# Patient Record
Sex: FEMALE | Employment: FULL TIME | URBAN - METROPOLITAN AREA
[De-identification: names, ages, dates, MRNs, and addresses within clinical notes are randomized per-mention and may not be internally consistent; named-entity substitution may affect disease eponyms.]

---

## 2019-05-02 ENCOUNTER — HOSPITAL ENCOUNTER (OUTPATIENT)
Dept: PHYSICAL THERAPY | Age: 44
Discharge: HOME | End: 2019-05-02
Payer: COMMERCIAL

## 2019-05-02 PROCEDURE — 97110 THERAPEUTIC EXERCISES: CPT | Performed by: PHYSICAL THERAPIST

## 2019-05-02 PROCEDURE — 97161 PT EVAL LOW COMPLEX 20 MIN: CPT | Performed by: PHYSICAL THERAPIST

## 2019-05-02 NOTE — PROGRESS NOTES
[facility], [address] [place] [address] Phone: [phone]  Fax: [phone] Plan of Care/Statement of Necessity for Physical Therapy Services  2-15 Patient name: Angelo Pastrana  : 1975  Provider#: [id number] Referral source: Referred, Self, MD     
Medical/Treatment Diagnosis: Pain in right shoulder [M25.511] Prior Hospitalization: see medical history Comorbidities: see evaluation. Prior Level of Function: see evaluation. Medications: Verified on Patient Summary List 
 
Start of Care: see evaluation. Onset Date: See evaluation The Plan of Care and following information is based on the information from the initial evaluation. Assessment/ key information: Pt is a 42 yo female with increased right shoulder pain since 2018. Pt notes past PT was somewhat helpful but feels she hit a plateau. Pt presents with weakness in RC and signs of decreased periscapular strength, painful ROM (PROM, AROM) and decreased ability to reach overhead or across her body. Treatment Plan may include any combination of the following: Therapeutic exercise, Therapeutic activities, Neuromuscular re-education, Physical agent/modality, Manual therapy, Patient education and Self Care training Patient / Family readiness to learn indicated by: asking questions, trying to perform skills and interest 
Persons(s) to be included in education: patient (P) Barriers to Learning/Limitations: None Patient Goal (s): see eval Patient Self Reported Health Status: good Rehabilitation Potential: good Short Term Goals: To be accomplished in 2-3 weeks: 
 1) Pt independent in HEP from day 1 
 2) Pt will be able to reach overhead to 140 deg or more of AROM without increased pain Long Term Goals: To be accomplished in 4-6 weeks: 
 1) Pt independent in final HEP.  2) Pt will report she is able perform ADL's such as reaching to opposite underarm to apply deodorant, etc.  
 3) Pt will be able to return to UE strengthening at the gym with modifications as needed from PT without increased pain. 4) Pt will be able to reach behind her back to T10 or higher for less difficulty with tucking in her shirt or bathing. Frequency / Duration: Patient to be seen 2 times per week for 4-6 weeks. Patient/ Caregiver education and instruction: self care and exercises 
 
[x]  Plan of care has been reviewed with LEWIS Portillo, PT DPT, [facility] 5/2/2019 3:55 PM 
 
________________________________________________________________________ I certify that the above Therapy Services are being furnished while the patient is under my care. I agree with the treatment plan and certify that this therapy is necessary. [de-identified] Signature:____________________  Date:____________Time: _________

## 2019-05-02 NOTE — PROGRESS NOTES
[employer] [facility] [address], [place], [address] Phone: [phone]  Fax: [phone] PT INITIAL EVALUATION NOTE - [facility] 2-15 Patient Name: Eric Pritchard Date:2019 : 1975 [x]  Patient  Verified Payor: BLUE CROSS / Plan: [employer] / Product Type: HMO /   
In time:4:05  Out time:5:05 Total Treatment Time (min): 60 Total Timed Codes (min): 25 
1:1 Treatment Time ([facility] only): n/a Visit #: 1 Treatment Area: Pain in right shoulder [M25.511] SUBJECTIVE Any medication changes, allergies to medications, adverse drug reactions, diagnosis change, or new procedure performed?: [] No    [x] Yes (see summary sheet for update) Current symptoms/chief complaint:  Right shoulder pain. Date of onset/injury: 2018. Went to Ortho in 2018 and was told she had bursitis. She had x-rays and these were negative. He gave her meds but she prefers not to take medication. She didn't take it. She went to acupuncture and went to a PT from December through 2019. She reports things are getting better but she felt like there was no communication so she wanted to change. The only home exercises she had were standing shoulder ER and diagonals and reaching behind her back with the towel. She reports they did some cradling of her arm, she was doing standing tricep extensions. She went to [facility]. Aggravated by:  Lift something heavy makes it worse. Reaching into abduction, adduction for ADL's. Reaching overhead or overextending forward. She can't put a bar or a broomstick across her shoulders to perform squats. Reaching behind her to get her pocket book from behind her. Reaching over to turn her alarm off in the morning, yawning and stretching in the morning. Reaching behind her back is hard too. She is nervous of making it worse and avoids trying to throw a ball with her son. Eased by: rest.  
 
Pt has tried icing on and off. Pain Level (0-10 scale): Current: 0/10. Worst: 6/10. Location of symptoms: right shoulder, lateral deltoid. She used to hear clicks but she doesn't anymore. Pain will be sharp and then it goes away. PMH: Significant for unremarkable but is right handed. 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 She used to work to go to [employer] and did upper body, now she is focused on lower body due to shoulder pain. She has 2 boys 15 yo 6 yo. Prior level of function: Was able to exercise upper body at the gym, reach and perform ADL's without pain prior to September 2018. Patient goal(s): \"have my right shoulder feel back normal so I can do the things I used to\" Objective:   
 
Observation/posture: slight medial border winging on the RIGHT. 
 
ROM:   
                                           AROM                                                                PROM Right Left  Right Left Flexion 135 p! Lateral shoulder 150 Flexion 146 p! nt  
 p! Lateral shoulder 170  p! nt  
   ER @ 90 Degrees 67 p! (most painful motion) 105 IR @ 90 Degrees 40  64 Functional ER nt nt Functional IR L4 painful T6 Scapulohumoral Control / Rhythm: right scapula increased UR with flexion and abduction as compared to the left. Accessory Motions: NT today. Strength:                                                                          
 R (1-5) L (1-5) Shoulder Scaption / supraspinatus 4 5 Shoulder Abduction nt 5 Shoulder ER 4 5 Shoulder IR 4 5 Elbow Flexion NT NT Elbow Extension NT NT  
Supination NT NT  
Pronation NT NT  
 
Palpation:  No TTP supraspinatus, infraspinatus or biceps (proximal portion) today. Increased tissue turgor and TTP R UT as compared to L UT muscles. Special Tests: 
Painful arc  [] Pos   [x] Neg Neer's Test  [x] Pos   [] Neg  
 Hawkin's Test  [x] Pos   [] Neg  
Empty Can  [x] Pos   [] Neg 
Full Can   [x] Pos   [] Neg  
 
Speed's Test  [] Pos   [x] Neg for pain, + for weakness on the RIGHT Scapular Assist Test  [] Pos   [x] Neg 
Scapular Retraction Test [x] Pos   [] Neg Outcome Measure: Patient presents with an initial FOTO score of next visit. Today's Treatment: 
 
25 min Therapeutic Exercise:  [x] See flow sheet : Pt performed all HEP per HEP sheet. See in paper chart. Rationale: increase strength, improve coordination and increase proprioception to improve the patients ability to reach [facility]. With 
 [] TE 
 [] TA 
 [] neuro 
 [] other: Patient Education: [x] Review HEP [] Progressed/Changed HEP based on:  
[] positioning   [] body mechanics   [] transfers   [] heat/ice application   
[] other:   
 
Pain Level (0-10 scale) post treatment: 0 Assessment:  
[x] See POC Plan:   
[x] See POC Remi Dupree PT, DPT, O[facility]  
5/2/2019 3:51 PM

## 2019-05-07 ENCOUNTER — HOSPITAL ENCOUNTER (OUTPATIENT)
Dept: PHYSICAL THERAPY | Age: 44
Discharge: HOME | End: 2019-05-07
Payer: COMMERCIAL

## 2019-05-07 PROCEDURE — 97110 THERAPEUTIC EXERCISES: CPT | Performed by: PHYSICAL THERAPIST

## 2019-05-07 NOTE — PROGRESS NOTES
PT DAILY TREATMENT NOTE 2-15 Patient Name: Eric Organ Date:2019 : 1975 [x]  Patient  Verified Payor: BLUE CROSS / Plan: [employer] / Product Type: HMO /   
In time:4:05  Out time:4:45 Total Treatment Time (min): 40 Total Timed Codes (min): 40 
1:1 Treatment Time ( only): 40 Visit #: 2 Treatment Area: Pain in right shoulder [M25.511] SUBJECTIVE Pain Level (0-10 scale), subjective functional status/changes: Pt reports 0/10 pain at rest but still having pain when trying to reach up overhead. Pt reports no pain with her HEP from day 1. Any medication changes, allergies to medications, adverse drug reactions, diagnosis change, or new procedure performed?: [x] No    [] Yes (see summary sheet for update) SEE ABOVE. OBJECTIVE Pt with increased pain with shoulder flexion and shows shoulder shrug.   
 
- min Modality:   
  []  Ice     []  Heat Position/location:   -  
Rationale: decrease pain to improve the patients ability to do functional activities [x] Skin assessment post-treatment:  [x]intact []redness- no adverse reaction 
  []redness  adverse reaction:  
  
40 min Therapeutic Exercise:  [x] See flow sheet : added supine shoulder IR and ER, prone horizonal ABD, prone shoulder extension. Brief manual therapy : rhythmic stab. In varying degrees of scaption for IR/ER up to 90 deg. Rationale: increase strength, improve coordination and increase proprioception to improve the patients ability to reach [facility] 
 
- min Manual Therapy:  -  
Rationale: decrease pain, increase tissue extensibility and decrease trigger points  to improve the patients ability to - With 
 [] TE 
 [] TA 
 [] neuro 
 [] other: Patient Education: [x] Review HEP [] Progressed/Changed HEP based on:  
[] positioning   [] body mechanics   [] transfers   [] heat/ice application   
[] other:   
 
 
Pain Level (0-10 scale) post treatment: 0, pt notes decreased pain with right shoulder flexion end of session, noted decreased shoulder shrug. ASSESSMENT/Changes in Function: Pt with decreased strength/stability with increased scaption with rhythmic stabilization. Patient will continue to benefit from skilled PT services to modify and progress therapeutic interventions, address ROM deficits, address strength deficits, analyze and cue movement patterns, assess and modify postural abnormalities and instruct in home and community integration to attain remaining goals. Progress towards goals / Updated goals: 
Goals were not re-assessed today. PLAN    [x]  Continue plan of care 
 
[]  Other:_   
 
Srinivas Nicholson, PT DPT, O[facility] 5/7/2019  1:72 PM       PT License #[id number]

## 2019-05-09 ENCOUNTER — HOSPITAL ENCOUNTER (OUTPATIENT)
Dept: PHYSICAL THERAPY | Age: 44
Discharge: HOME | End: 2019-05-09
Payer: COMMERCIAL

## 2019-05-09 PROCEDURE — 97110 THERAPEUTIC EXERCISES: CPT | Performed by: PHYSICAL THERAPIST

## 2019-05-09 PROCEDURE — 97140 MANUAL THERAPY 1/> REGIONS: CPT | Performed by: PHYSICAL THERAPIST

## 2019-05-09 NOTE — PROGRESS NOTES
PT DAILY TREATMENT NOTE 2-15 Patient Name: Jaz Duff Date:2019 : 1975 [x]  Patient  Verified Payor: BLUE CROSS / Plan: Tami Gay / Product Type: HMO /    
In time:4:00  Out time:4:55 Total Treatment Time (min):55 Total Timed Codes (min): 45 
1:1 Treatment Time [facility] only):45 Visit #: 3 Treatment Area: Pain in right shoulder [M25.511] SUBJECTIVE Pain Level (0-10 scale), subjective functional status/changes: Pt reports 0/10 pain at rest.  Pt reports she does still have pain with donning/doffing clothes but less painful that it has been in the past. 
Any medication changes, allergies to medications, adverse drug reactions, diagnosis change, or new procedure performed?: [x] No    [] Yes (see summary sheet for update) SEE ABOVE. OBJECTIVE 10 min Modality:   
  [x]  Ice     []  Heat Position/location:    R shoulder end of session Rationale: decrease pain to improve the patients ability to do functional activities [x] Skin assessment post-treatment:  [x]intact []redness- no adverse reaction 
  []redness  adverse reaction:  
  
35 min Therapeutic Exercise:  [x] See flow sheet: supine ABC, supine SA punch and progressed to stand ER and IR with t-band. Rationale: increase strength, improve coordination and increase proprioception to improve the patients ability to reach [place] 
 
10 min Manual Therapy: rhythmic stab. In varying degrees of scaption for IR/ER up to 90 deg. Rhythmic stab. In varying degrees of flexion. Resistance more proximal.  
Rationale: decrease pain, increase tissue extensibility and decrease trigger points  to improve the patients ability to - With 
 [] TE 
 [] TA 
 [] neuro 
 [] other: Patient Education: [x] Review HEP [] Progressed/Changed HEP based on:  
[] positioning   [] body mechanics   [] transfers   [] heat/ice application   
[] other: Pain Level (0-10 scale) post treatment: 0, pt notes decreased pain with right shoulder flexion end of session. ASSESSMENT/Changes in Function: Pt without increased pain today and able to progress with ther. Ex. To focus on RC strength and scap. Stability. Patient will continue to benefit from skilled PT services to modify and progress therapeutic interventions, address ROM deficits, address strength deficits, analyze and cue movement patterns, assess and modify postural abnormalities and instruct in home and community integration to attain remaining goals. Progress towards goals / Updated goals: 
Goals were not re-assessed today. PLAN    [x]  Continue plan of care 
 
[]  Other:_   
 
Srinivas Nicholson, PT DPT, O[facility] 5/9/2019  8:01 PM       PT License #[id number]

## 2019-05-14 ENCOUNTER — APPOINTMENT (OUTPATIENT)
Dept: PHYSICAL THERAPY | Age: 44
End: 2019-05-14
Payer: COMMERCIAL

## 2019-05-16 ENCOUNTER — HOSPITAL ENCOUNTER (OUTPATIENT)
Dept: PHYSICAL THERAPY | Age: 44
Discharge: HOME | End: 2019-05-16
Payer: COMMERCIAL

## 2019-05-16 PROCEDURE — 97140 MANUAL THERAPY 1/> REGIONS: CPT | Performed by: PHYSICAL THERAPIST

## 2019-05-16 PROCEDURE — 97110 THERAPEUTIC EXERCISES: CPT | Performed by: PHYSICAL THERAPIST

## 2019-05-16 NOTE — PROGRESS NOTES
PT DAILY TREATMENT NOTE 2-15 Patient Name: Chidi Swain Date:2019 : 1975 [x]  Patient  Verified Payor: BLUE CROSS / Plan: Ama Perez / Product Type: HMO /    
In time:4:00  Out time:500 Total Treatment Time (min):60 Total Timed Codes (min): 50 
1:1 Treatment Time [facility] only):35 Visit #: 4 Treatment Area: Pain in right shoulder [M25.511] SUBJECTIVE Pain Level (0-10 scale), subjective functional status/changes: Pt reports 0/10 pain at rest.  Pt reports since using the pillow she is sleeping better and overall she has less pain with movement of her shoulder. Any medication changes, allergies to medications, adverse drug reactions, diagnosis change, or new procedure performed?: [x] No    [] Yes (see summary sheet for update) SEE ABOVE. OBJECTIVE  
R shoulder flexion 135 deg,  deg (was 129 deg), Functional IR to T10 vertebral level (all AROM) 10 min Modality:   
  [x]  Ice     []  Heat Position/location:    R shoulder end of session Rationale: decrease pain to improve the patients ability to do functional activities [x] Skin assessment post-treatment:  [x]intact []redness- no adverse reaction 
  []redness  adverse reaction:  
  
40 min Therapeutic Exercise:  [x] See flow sheet: Added t-band rows, sh. Ext. Rationale: increase strength, improve coordination and increase proprioception to improve the patients ability to reach [place] 
 
10 min Manual Therapy: rhythmic stab. In varying degrees of scaption for IR/ER up to 90 deg without resistance then with 2 lb weight. Rhythmic stab. In varying degrees of flexion without resistance then with 2 lb weight. Resistance more proximal.  
Rationale: decrease pain, increase tissue extensibility and decrease trigger points  to improve the patients ability to - With 
 [] TE 
 [] TA 
 [] neuro 
 [] other: Patient Education: [x] Review HEP [] Progressed/Changed HEP based on: [] positioning   [] body mechanics   [] transfers   [] heat/ice application   
[] other:   
 
 Pain Level (0-10 scale) post treatment: 0 
 
ASSESSMENT/Changes in Function: Pt with improved right shoulder abduction and decreased pain overall with shoulder AROM since first visit. Patient will continue to benefit from skilled PT services to modify and progress therapeutic interventions, address ROM deficits, address strength deficits, analyze and cue movement patterns, assess and modify postural abnormalities and instruct in home and community integration to attain remaining goals. Progress towards goals / Updated goals: 
Goals were not re-assessed today. PLAN    [x]  Continue plan of care 
 
[]  Other:_   
 
Karen Oppenheim, PT DPT, OCS 5/16/2019  5:07 PM       PT License #[id number]

## 2019-05-23 ENCOUNTER — HOSPITAL ENCOUNTER (OUTPATIENT)
Dept: PHYSICAL THERAPY | Age: 44
Discharge: HOME | End: 2019-05-23
Payer: COMMERCIAL

## 2019-05-23 PROCEDURE — 97110 THERAPEUTIC EXERCISES: CPT | Performed by: PHYSICAL THERAPIST

## 2019-05-23 PROCEDURE — 97140 MANUAL THERAPY 1/> REGIONS: CPT | Performed by: PHYSICAL THERAPIST

## 2019-05-23 NOTE — PROGRESS NOTES
PT DAILY TREATMENT NOTE 2-15    Patient Name: Jackelyn Rick  Date:2019  : 1975  [x]  Patient  Verified  Payor: Jigna Acevedo / Plan: Justine Griffith / Product Type: HMO /     In time:4:00  Out time: 505   Total Treatment Time (min):65   Total Timed Codes (min): 55  1:1 Treatment Time [facility] only):55    Visit #: 5    Treatment Area: Pain in right shoulder [M25.511]    SUBJECTIVE  Pain Level (0-10 scale), subjective functional status/changes: Pt reports 0/10 pain at rest.  Pt reports with some movements she is getting some lateral proxial shoulder pain. Any medication changes, allergies to medications, adverse drug reactions, diagnosis change, or new procedure performed?: [x] No    [] Yes (see summary sheet for update) SEE ABOVE. OBJECTIVE         10 min Modality:      [x]  Ice     []  Heat       Position/location:    R shoulder end of session   Rationale: decrease pain to improve the patients ability to do functional activities   [x] Skin assessment post-treatment:  [x]intact []redness- no adverse reaction    []redness  adverse reaction:      45 min Therapeutic Exercise:  [x] See flow sheet: Added ball on wall and scapular pro/retraction on rockerboard in quadruped. Rationale: increase strength, improve coordination and increase proprioception to improve the patients ability to reach [place]    10 min Manual Therapy: rhythmic stab. In varying degrees of scaption for IR/ER up to 90 deg without resistance then with 2 lb weight. Rhythmic stab. In varying degrees of flexion without resistance then with 2 lb weight.    Resistance more proximal.   Rationale: decrease pain, increase tissue extensibility and decrease trigger points  to improve the patients ability to -          With   [] TE   [] TA   [] neuro   [] other: Patient Education: [x] Review HEP    [] Progressed/Changed HEP based on:   [] positioning   [] body mechanics   [] transfers   [] heat/ice application    [] other:       Pain Level (0-10 scale) post treatment: 0    ASSESSMENT/Changes in Function:   Pt did well with progression in ther. Ex. Today. She had some pain with standing shoulder IR so we held that today. Patient will continue to benefit from skilled PT services to modify and progress therapeutic interventions, address ROM deficits, address strength deficits, analyze and cue movement patterns, assess and modify postural abnormalities and instruct in home and community integration to attain remaining goals. Progress towards goals / Updated goals:  Goals were not re-assessed today.      PLAN      [x]  Continue plan of care    []  Other:_      Cindy Villa, PT DPT, OCS 2019  6:91 PM       PT License #[id number]

## 2019-05-28 ENCOUNTER — HOSPITAL ENCOUNTER (OUTPATIENT)
Dept: PHYSICAL THERAPY | Age: 44
Discharge: HOME | End: 2019-05-28
Payer: COMMERCIAL

## 2019-05-28 PROCEDURE — 97140 MANUAL THERAPY 1/> REGIONS: CPT | Performed by: PHYSICAL THERAPIST

## 2019-05-28 PROCEDURE — 97110 THERAPEUTIC EXERCISES: CPT | Performed by: PHYSICAL THERAPIST

## 2019-05-28 NOTE — PROGRESS NOTES
PT DAILY TREATMENT NOTE 2-15    Patient Name: Neri Luna  Date:2019  : 1975  [x]  Patient  Verified  Payor: Primo Cedeño / Plan: Mauro Aadme / Product Type: HMO /     In time:4:00  Out time: 5:00   Total Treatment Time (min): 60   Total Timed Codes (min): 50  1:1 Treatment Time [facility] only):40    Visit #: 6    Treatment Area: Pain in right shoulder [M25.511]    SUBJECTIVE  Pain Level (0-10 scale), subjective functional status/changes: Pt reports 0/10 pain at rest.  She did have some shoulder pain earlier today while making a movement to keep the strap on her dress on. Any medication changes, allergies to medications, adverse drug reactions, diagnosis change, or new procedure performed?: [x] No    [] Yes (see summary sheet for update) SEE ABOVE. OBJECTIVE         10 min Modality:      [x]  Ice     []  Heat       Position/location:    R shoulder end of session   Rationale: decrease pain to improve the patients ability to do functional activities   [x] Skin assessment post-treatment:  [x]intact []redness- no adverse reaction    []redness  adverse reaction:      40 min Therapeutic Exercise:  [x] See flow sheet: Progressed prone T and I to over swiss ball       Rationale: increase strength, improve coordination and increase proprioception to improve the patients ability to reach [place]    10 min Manual Therapy: rhythmic stab. In varying degrees of scaption for IR/ER up to 90 deg without resistance then with 2 lb weight (scaption only)    Attempted light resistance with D1 but inc. Pain, light resistance with PNF D2 pattern in pain free range approx 10-20 reps.     Rationale: decrease pain, increase tissue extensibility and decrease trigger points  to improve the patients ability to -          With   [] TE   [] TA   [] neuro   [] other: Patient Education: [x] Review HEP    [] Progressed/Changed HEP based on:   [] positioning   [] body mechanics   [] transfers   [] heat/ice application    [] other:       Pain Level (0-10 scale) post treatment: 0    ASSESSMENT/Changes in Function:   Pt did well with progression to prone over swiss ball. She seems to have the least stability and control with shoulder in closer to end range flexion/scaption. Patient will continue to benefit from skilled PT services to modify and progress therapeutic interventions, address ROM deficits, address strength deficits, analyze and cue movement patterns, assess and modify postural abnormalities and instruct in home and community integration to attain remaining goals. Progress towards goals / Updated goals:  Goals were not re-assessed today. PLAN      [x]  Continue plan of care    [x]  Other:_ add quadruped self ball throw?       Danisha Miller, PT DPT, OCS 5/28/2019  8:97 PM       PT License #[id number]

## 2019-05-30 ENCOUNTER — HOSPITAL ENCOUNTER (OUTPATIENT)
Dept: PHYSICAL THERAPY | Age: 44
Discharge: HOME | End: 2019-05-30
Payer: COMMERCIAL

## 2019-05-30 PROCEDURE — 97110 THERAPEUTIC EXERCISES: CPT | Performed by: PHYSICAL THERAPIST

## 2019-05-30 PROCEDURE — 97140 MANUAL THERAPY 1/> REGIONS: CPT | Performed by: PHYSICAL THERAPIST

## 2019-05-30 NOTE — PROGRESS NOTES
PT Re-assessment DAILY TREATMENT NOTE 2-15    Patient Name: Wesley Haider  Date:2019  : 1975  [x]  Patient  Verified  Payor: Avni Burnett / Plan: Sudeep Wayne / Product Type: HMO /     In time:3:30  Out time: 435    Total Treatment Time (min):65    Total Timed Codes (min): 65  1:1 Treatment Time [facility] only):40    Visit #: 7    Treatment Area: Pain in right shoulder [M25.511]    SUBJECTIVE  Pain Level (0-10 scale), subjective functional status/changes: Pt reports pain is 0/10 at rest.  She still has pain when applying deodorant, taking a shirt on and off but it is less severe as compared to the first day. Any medication changes, allergies to medications, adverse drug reactions, diagnosis change, or new procedure performed?: [x] No    [] Yes (see summary sheet for update) SEE ABOVE. OBJECTIVE     * LEFT was not measured again today, LEFT measurements are from evaluation. Observation/posture: slight medial border winging on the RIGHT.     ROM:                                               AROM                                                                PROM    Right Left   Right Left   Flexion  137 with a \"pull\" and \"uncomfortable\"  150   nt    deg with pain. 170   nt         ER @ 90 Degrees 70 p!  (most painful motion) 105          IR @ 90 Degrees 40  64   Functional ER nt nt         Functional IR L2 painful T6                               Accessory Motions: some hypomobility with inferior and posterior gh mobs.      Strength:                                                                             R (1-5) L (1-5)   Shoulder Scaption / supraspinatus 4 p! 5   Shoulder Abduction nt 5   Shoulder ER 5 5   Shoulder IR 5 5   Elbow Flexion NT NT   Elbow Extension NT NT   Supination NT NT   Pronation NT NT     Special Tests:    Neer's Test                  [x] Pos   [] Neg        Hawkin's Test              [x] Pos   [] Neg          Full Can                       [x] Pos   [] Neg Scapular Retraction Test        [x] Pos   [] Neg             To go min Modality:      [x]  Ice     []  Heat       Position/location:    R shoulder end of session   Rationale: decrease pain to improve the patients ability to do functional activities   [x] Skin assessment post-treatment:  [x]intact []redness- no adverse reaction    []redness  adverse reaction:      55 min Therapeutic Exercise:  [x] See flow sheet:   Re-assessment. Progressed per flow sheet. Rationale: increase strength, improve coordination and increase proprioception to improve the patients ability to reach [place]    10 min Manual Therapy: rhythmic stab. In varying degrees of scaption for IR/ER up to 90 deg without resistance then with 2 lb weight (scaption only)    Inferior and posterior GH mobs. Rationale: decrease pain, increase tissue extensibility and decrease trigger points  to improve the patients ability to -          With   [] TE   [] TA   [] neuro   [] other: Patient Education: [x] Review HEP    [] Progressed/Changed HEP based on:   [] positioning   [] body mechanics   [] transfers   [] heat/ice application    [] other:       Pain Level (0-10 scale) post treatment: 0    ASSESSMENT/Changes in Function:   Pt with 7 skilled PT session from 05/02 through 05/30. Pt shows improved ROM (ABD) with less pain with motions. She is still restricted on the right as compared to the left and cont. To have pain with don/doff a shirt and with reaching up behind her back. Also with applying deodorant. She notes pain is less severe. We are recommending more PT but that she should also f/u with Dr. Chavo Henson. Patient will continue to benefit from skilled PT services to modify and progress therapeutic interventions, address ROM deficits, address strength deficits, analyze and cue movement patterns, assess and modify postural abnormalities and instruct in home and community integration to attain remaining goals. Short Term Goals:  To be accomplished in 2-3 weeks:              1) Pt independent in HEP from day 1 MET               2) Pt will be able to reach overhead to 140 deg or more of AROM without increased pain PROGRESSING     Long Term Goals: To be accomplished in 4-6 weeks:              1) Pt independent in final HEP. progressing              2) Pt will report she is able perform ADL's such as reaching to opposite underarm to apply deodorant, etc. progressing               3) Pt will be able to return to UE strengthening at the gym with modifications as needed from PT without increased pain. progressing              4) Pt will be able to reach behind her back to T10 or higher for less difficulty with tucking in her shirt or bathing. progressing.        PLAN      [x]  Continue plan of care    [x]  Other:_ cont 1-2x/week for 4 weeks from 05/30     Joaquina Quiroz PT DPT, [facility] 5/30/2019  0:05 PM       PT License #[id number]

## 2019-06-04 ENCOUNTER — HOSPITAL ENCOUNTER (OUTPATIENT)
Dept: PHYSICAL THERAPY | Age: 44
Discharge: HOME | End: 2019-06-04
Payer: COMMERCIAL

## 2019-06-04 PROCEDURE — 97110 THERAPEUTIC EXERCISES: CPT | Performed by: PHYSICAL THERAPIST

## 2019-06-04 PROCEDURE — 97140 MANUAL THERAPY 1/> REGIONS: CPT | Performed by: PHYSICAL THERAPIST

## 2019-06-04 NOTE — PROGRESS NOTES
PT  DAILY TREATMENT NOTE 2-15    Patient Name: Krystina Gordon  Date:2019  : 1975  [x]  Patient  Verified  Payor: Merritt Kincaid / Plan: Fabricio Nab / Product Type: HMO /     In time:4:00  Out time: 510   Total Treatment Time (min):70    Total Timed Codes (min): 60  1:1 Treatment Time ( [address] only): 50   Visit #: 8    Treatment Area: Pain in right shoulder [M25.511]    SUBJECTIVE  Pain Level (0-10 scale), subjective functional status/changes: Pt reports pain is 0/10 at rest.  Pt reports she was able to get in with Dr. Cira Rodriguez for . She is planning to ask him to have a MRI because she just really wants to see what is going on for piece of mind. Any medication changes, allergies to medications, adverse drug reactions, diagnosis change, or new procedure performed?: [x] No    [] Yes (see summary sheet for update) SEE ABOVE. OBJECTIVE     10' min Modality:      [x]  Ice     []  Heat       Position/location:    R shoulder end of session   Rationale: decrease pain to improve the patients ability to do functional activities   [x] Skin assessment post-treatment:  [x]intact []redness- no adverse reaction    []redness  adverse reaction:      50 min Therapeutic Exercise:  [x] See flow sheet:     Added thoracic extension over [employer] 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 Rationale: increase strength, improve coordination and increase proprioception to improve the patients ability to reach [place]    10 min Manual Therapy: thoracic PA mobs T3 through T7. Central - grade III   Inferior and posterior GH mobs.     Rationale: decrease pain, increase tissue extensibility and decrease trigger points  to improve the patients ability to -          With   [] TE   [] TA   [] neuro   [] other: Patient Education: [x] Review HEP    [] Progressed/Changed HEP based on:   [] positioning   [] body mechanics   [] transfers   [] heat/ice application    [] other: Pain Level (0-10 scale) post treatment: 0    ASSESSMENT/Changes in Function:   Pt did well with progress in ther ex. She does continue with most pain with shoulder abduction. Patient will continue to benefit from skilled PT services to modify and progress therapeutic interventions, address ROM deficits, address strength deficits, analyze and cue movement patterns, assess and modify postural abnormalities and instruct in home and community integration to attain remaining goals. Short Term Goals: To be accomplished in 2-3 weeks:              1) Pt independent in HEP from day 1 MET               2) Pt will be able to reach overhead to 140 deg or more of AROM without increased pain PROGRESSING     Long Term Goals: To be accomplished in 4-6 weeks:              1) Pt independent in final HEP. progressing              2) Pt will report she is able perform ADL's such as reaching to opposite underarm to apply deodorant, etc. progressing               3) Pt will be able to return to UE strengthening at the gym with modifications as needed from PT without increased pain. progressing              4) Pt will be able to reach behind her back to T10 or higher for less difficulty with tucking in her shirt or bathing. progressing.        PLAN      [x]  Continue plan of care    [x]  Other:_ cont 1-2x/week for 4 weeks from 05/30     Aime Lozano, PT DPT, [facility] 6/4/2019  9:50 PM       PT License #[id number]

## 2019-06-06 ENCOUNTER — HOSPITAL ENCOUNTER (OUTPATIENT)
Dept: PHYSICAL THERAPY | Age: 44
Discharge: HOME | End: 2019-06-06
Payer: COMMERCIAL

## 2019-06-06 PROCEDURE — 97140 MANUAL THERAPY 1/> REGIONS: CPT | Performed by: PHYSICAL THERAPIST

## 2019-06-06 PROCEDURE — 97110 THERAPEUTIC EXERCISES: CPT | Performed by: PHYSICAL THERAPIST

## 2019-06-10 ENCOUNTER — APPOINTMENT (OUTPATIENT)
Dept: PHYSICAL THERAPY | Age: 44
End: 2019-06-10
Payer: COMMERCIAL

## 2019-06-13 ENCOUNTER — APPOINTMENT (OUTPATIENT)
Dept: PHYSICAL THERAPY | Age: 44
End: 2019-06-13
Payer: COMMERCIAL

## 2019-06-17 ENCOUNTER — HOSPITAL ENCOUNTER (OUTPATIENT)
Dept: MAMMOGRAPHY | Age: 44
Discharge: HOME | End: 2019-06-17
Attending: SPECIALIST
Payer: COMMERCIAL

## 2019-06-17 DIAGNOSIS — Z12.39 BREAST CANCER SCREENING: ICD-10-CM

## 2019-06-17 PROCEDURE — 77063 BREAST TOMOSYNTHESIS BI: CPT

## 2019-06-20 ENCOUNTER — HOSPITAL ENCOUNTER (OUTPATIENT)
Dept: PHYSICAL THERAPY | Age: 44
Discharge: HOME | End: 2019-06-20
Payer: COMMERCIAL

## 2019-06-20 PROCEDURE — 97110 THERAPEUTIC EXERCISES: CPT | Performed by: PHYSICAL THERAPIST

## 2019-06-20 PROCEDURE — 97140 MANUAL THERAPY 1/> REGIONS: CPT | Performed by: PHYSICAL THERAPIST

## 2019-06-20 NOTE — PROGRESS NOTES
PT  Re-eval / DAILY TREATMENT NOTE 2-15    Patient Name: Wesley Haider  Date:2019  : 1975  [x]  Patient  Verified  Payor: Avni Burnett / Plan: Sudeep Wayne / Product Type: HMO /     In time: 330  Out time:   435  Total Treatment Time (min):65    Total Timed Codes (min): 55  1:1 Treatment Time [facility] only):55    Visit #: 10    Treatment Area: Pain in right shoulder [M25.511]    SUBJECTIVE  Pain Level (0-10 scale), subjective functional status/changes: Pt reports pain is 0/10 at rest.  Pt reports she is not having as much severe pain / can move it better since cortisone injection. Dr. Brielle Zamora thinks she has frozen shoulder and advised her to take a break from PT last week. Any medication changes, allergies to medications, adverse drug reactions, diagnosis change, or new procedure performed?: [x] No    [] Yes (see summary sheet for update) SEE ABOVE. OBJECTIVE     R shoulder flexion 127 deg,  deg, scaption 135 deg, Functional IR to T12      10' min Modality:      [x]  Ice     []  Heat       Position/location:    R shoulder end of session   Rationale: decrease pain to improve the patients ability to do functional activities   [x] Skin assessment post-treatment:  [x]intact []redness- no adverse reaction    []redness  adverse reaction:      30 min Therapeutic Exercise:  [x] See flow sheet:     Focused on AAROM home program per flow sheet and updated HEP. Rationale: increase strength, improve coordination and increase proprioception to improve the patients ability to reach [place]    25 min Manual Therapy: thoracic PA mobs T3 through T7. Central - grade III   Inferior and posterior GH mobs.     Rationale: decrease pain, increase tissue extensibility and decrease trigger points  to improve the patients ability to -          With   [] TE   [] TA   [] neuro   [] other: Patient Education: [x] Review HEP    [] Progressed/Changed HEP based on:   [] positioning   [] body mechanics   [] transfers [] heat/ice application    [] other:       Pain Level (0-10 scale) post treatment: 0      ASSESSMENT/Changes in Function:   Pt with recent dx of adhesive capsulitis with cortisone injection for right shoulder. We will focus on improving joint mobility and ROM. Patient will continue to benefit from skilled PT services to modify and progress therapeutic interventions, address ROM deficits, address strength deficits, analyze and cue movement patterns, assess and modify postural abnormalities and instruct in home and community integration to attain remaining goals. Short Term Goals: To be accomplished in 2-3 weeks:              1) Pt independent in HEP from day 1 MET               2) Pt will be able to reach overhead to 140 deg or more of AROM without increased pain PROGRESSING     Long Term Goals: To be accomplished in 4-6 weeks:              1) Pt independent in final HEP. progressing              2) Pt will report she is able perform ADL's such as reaching to opposite underarm to apply deodorant, etc. progressing               3) Pt will be able to return to UE strengthening at the gym with modifications as needed from PT without increased pain. progressing              4) Pt will be able to reach behind her back to T10 or higher for less difficulty with tucking in her shirt or bathing. progressing.        PLAN      [x]  Continue plan of care    [x]  Other:_ cont 1-2x/week for 4 weeks from 06/20     Vijay Serrano, PT DPT, [facility] 6/20/2019  8:46 PM       PT License #[id number]

## 2019-06-25 ENCOUNTER — HOSPITAL ENCOUNTER (OUTPATIENT)
Dept: PHYSICAL THERAPY | Age: 44
Discharge: HOME | End: 2019-06-25
Payer: COMMERCIAL

## 2019-06-25 PROCEDURE — 97110 THERAPEUTIC EXERCISES: CPT | Performed by: PHYSICAL THERAPIST

## 2019-06-25 PROCEDURE — 97140 MANUAL THERAPY 1/> REGIONS: CPT | Performed by: PHYSICAL THERAPIST

## 2019-06-25 NOTE — PROGRESS NOTES
PT DAILY TREATMENT NOTE 2-15    Patient Name: Chidi Swain  Date:2019  : 1975  [x]  Patient  Verified  Payor: Shannan Martin / Plan: Ama Perez / Product Type: HMO /     In time: 4:00  Out time:   455   Total Treatment Time (min):55    Total Timed Codes (min): 55  1:1 Treatment Time [facility] only):55    Visit #: 11    Treatment Area: Pain in right shoulder [M25.511]    SUBJECTIVE  Pain Level (0-10 scale), subjective functional status/changes: Pt reports pain is 0/10 at rest.  Pt reports she did some bicep curls and tricep ext. At the gym - had a little more arm soreness after this. Any medication changes, allergies to medications, adverse drug reactions, diagnosis change, or new procedure performed?: [x] No    [] Yes (see summary sheet for update) SEE ABOVE. OBJECTIVE     10' min Modality:      [x]  Ice     []  Heat       Position/location:    R shoulder end of session   Rationale: decrease pain to improve the patients ability to do functional activities   [x] Skin assessment post-treatment:  [x]intact []redness- no adverse reaction    []redness  adverse reaction:      30 min Therapeutic Exercise:  [x] See flow sheet:   Progressed per flow sheet. Rationale: increase strength, improve coordination and increase proprioception to improve the patients ability to reach [facility]    25 min Manual Therapy: inferior, posterior and anterior GH mobs. Passive ROM with stretch at end range flexion, abduction, IR and ER.      Rationale: decrease pain, increase tissue extensibility and decrease trigger points  to improve the patients ability to -          With   [] TE   [] TA   [] neuro   [] other: Patient Education: [x] Review HEP    [] Progressed/Changed HEP based on:   [] positioning   [] body mechanics   [] transfers   [] heat/ice application    [] other:       Pain Level (0-10 scale) post treatment: 0     ASSESSMENT/Changes in Function:   Pt notes decreased pain with her AAROM exercises with manual therapy performed beforehand. Patient will continue to benefit from skilled PT services to modify and progress therapeutic interventions, address ROM deficits, address strength deficits, analyze and cue movement patterns, assess and modify postural abnormalities and instruct in home and community integration to attain remaining goals. Short Term Goals: To be accomplished in 2-3 weeks:              1) Pt independent in HEP from day 1 MET               2) Pt will be able to reach overhead to 140 deg or more of AROM without increased pain PROGRESSING     Long Term Goals: To be accomplished in 4-6 weeks:              1) Pt independent in final HEP. progressing              2) Pt will report she is able perform ADL's such as reaching to opposite underarm to apply deodorant, etc. progressing               3) Pt will be able to return to UE strengthening at the gym with modifications as needed from PT without increased pain. progressing              4) Pt will be able to reach behind her back to T10 or higher for less difficulty with tucking in her shirt or bathing. progressing.        PLAN      [x]  Continue plan of care    [x]  Other:_ cont 1-2x/week for 4 weeks from 06/20     Jose De Jesus Castro, PT DPT, [facility] 6/25/2019  2:36 PM       PT License #[id number]

## 2019-06-27 ENCOUNTER — HOSPITAL ENCOUNTER (OUTPATIENT)
Dept: PHYSICAL THERAPY | Age: 44
Discharge: HOME | End: 2019-06-27
Payer: COMMERCIAL

## 2019-06-27 PROCEDURE — 97140 MANUAL THERAPY 1/> REGIONS: CPT | Performed by: PHYSICAL THERAPIST

## 2019-06-27 PROCEDURE — 97110 THERAPEUTIC EXERCISES: CPT | Performed by: PHYSICAL THERAPIST

## 2019-06-27 NOTE — PROGRESS NOTES
PT DAILY TREATMENT NOTE 2-15    Patient Name: Tabatha Chapman  Date:2019  : 1975  [x]  Patient  Verified  Payor: Abigail Holguin / Plan: Lexie Lawrence / Product Type: HMO /     In time: 4:00  Out time:   455  Total Treatment Time (min):55    Total Timed Codes (min): 55  1:1 Treatment Time ([address] only)55    Visit #: 12    Treatment Area: Pain in right shoulder [M25.511]    SUBJECTIVE  Pain Level (0-10 scale), subjective functional status/changes: Pt reports pain is 0/10 at rest.  Pt reports she felt a lot better after last visit, shoulder is feeling much better. Any medication changes, allergies to medications, adverse drug reactions, diagnosis change, or new procedure performed?: [x] No    [] Yes (see summary sheet for update) SEE ABOVE. OBJECTIVE     10' min Modality:      [x]  Ice     []  Heat       Position/location:    R shoulder end of session   Rationale: decrease pain to improve the patients ability to do functional activities   [x] Skin assessment post-treatment:  [x]intact []redness- no adverse reaction    []redness  adverse reaction:      30 min Therapeutic Exercise:  [x] See flow sheet:   Progressed per flow sheet. Rationale: increase strength, improve coordination and increase proprioception to improve the patients ability to reach [facility]    25 min Manual Therapy: inferior, posterior and anterior GH mobs. Passive ROM with stretch at end range flexion, abduction, IR and ER. Rationale: decrease pain, increase tissue extensibility and decrease trigger points  to improve the patients ability to -          With   [] TE   [] TA   [] neuro   [] other: Patient Education: [x] Review HEP    [] Progressed/Changed HEP based on:   [] positioning   [] body mechanics   [] transfers   [] heat/ice application    [] other:       Pain Level (0-10 scale) post treatment: 0     ASSESSMENT/Changes in Function:   Pt notes decreased pain/discomfort overall with certain movements and ther.  Ex. Patient will continue to benefit from skilled PT services to modify and progress therapeutic interventions, address ROM deficits, address strength deficits, analyze and cue movement patterns, assess and modify postural abnormalities and instruct in home and community integration to attain remaining goals. Short Term Goals: To be accomplished in 2-3 weeks:              1) Pt independent in HEP from day 1 MET               2) Pt will be able to reach overhead to 140 deg or more of AROM without increased pain PROGRESSING     Long Term Goals: To be accomplished in 4-6 weeks:              1) Pt independent in final HEP. progressing              2) Pt will report she is able perform ADL's such as reaching to opposite underarm to apply deodorant, etc. progressing               3) Pt will be able to return to UE strengthening at the gym with modifications as needed from PT without increased pain. progressing              4) Pt will be able to reach behind her back to T10 or higher for less difficulty with tucking in her shirt or bathing. progressing.        PLAN      [x]  Continue plan of care    [x]  Other:_ cont 1-2x/week for 4 weeks from 06/20     Tom Oilver, PT DPT, hospitals 6/27/2019  2:66 PM       PT License #[id number]

## 2019-07-01 ENCOUNTER — HOSPITAL ENCOUNTER (OUTPATIENT)
Dept: PHYSICAL THERAPY | Age: 44
Discharge: HOME | End: 2019-07-01
Payer: COMMERCIAL

## 2019-07-01 PROCEDURE — 97110 THERAPEUTIC EXERCISES: CPT | Performed by: PHYSICAL THERAPIST

## 2019-07-01 PROCEDURE — 97140 MANUAL THERAPY 1/> REGIONS: CPT | Performed by: PHYSICAL THERAPIST

## 2019-07-01 NOTE — PROGRESS NOTES
PT DAILY TREATMENT NOTE 2-15    Patient Name: Laly Welsh  Date:2019  : 1975  [x]  Patient  Verified  Payor: John Points / Plan: Dawson Pepe / Product Type: HMO /     In time: 300  Out time: 410    Total Treatment Time (min)70   Total Timed Codes (min): 60   1:1 Treatment Time ([address] only) 60    Visit #: 13    Treatment Area: Pain in right shoulder [M25.511]    SUBJECTIVE  Pain Level (0-10 scale), subjective functional status/changes: Pt reports pain is 0/10 at rest.  Pt reports she thinks her ROM has improved and she has less pain with donning and doffing clothing. She still is having difficulty reaching across her body to put on her sunscreen. Any medication changes, allergies to medications, adverse drug reactions, diagnosis change, or new procedure performed?: [x] No    [] Yes (see summary sheet for update) SEE ABOVE. OBJECTIVE     R shoulder AROM: flexion 143 deg,  deg, Functional IR T11. Cross body adduction : right : to left acromion. Left: to right scapula. 10' min Modality:      [x]  Ice     []  Heat       Position/location:    R shoulder end of session   Rationale: decrease pain to improve the patients ability to do functional activities   [x] Skin assessment post-treatment:  [x]intact []redness- no adverse reaction    []redness  adverse reaction:      30 min Therapeutic Exercise:  [x] See flow sheet:        Rationale: increase strength, improve coordination and increase proprioception to improve the patients ability to reach [place]    30 min Manual Therapy: inferior, posterior and anterior GH mobs. Passive ROM with stretch at end range flexion, abduction, IR and ER.      Rationale: decrease pain, increase tissue extensibility and decrease trigger points  to improve the patients ability to -          With   [] TE   [] TA   [] neuro   [] other: Patient Education: [x] Review HEP    [] Progressed/Changed HEP based on:   [] positioning   [] body mechanics   [] transfers   [] heat/ice application    [] other:       Pain Level (0-10 scale) post treatment: 0     ASSESSMENT/Changes in Function:     Pt with improved ROM as compared to last re-assessment. Pt notes improved ability to don/doff a shirt as well but still difficulty with reaching across her body. Patient will continue to benefit from skilled PT services to modify and progress therapeutic interventions, address ROM deficits, address strength deficits, analyze and cue movement patterns, assess and modify postural abnormalities and instruct in home and community integration to attain remaining goals. Short Term Goals: To be accomplished in 2-3 weeks:              1) Pt independent in HEP from day 1 MET               2) Pt will be able to reach overhead to 140 deg or more of AROM without increased pain PROGRESSING     Long Term Goals: To be accomplished in 4-6 weeks:              1) Pt independent in final HEP. progressing               2) Pt will report she is able perform ADL's such as reaching to opposite underarm to apply deodorant, etc. progressing               3) Pt will be able to return to UE strengthening at the gym with modifications as needed from PT without increased pain. progressing              4) Pt will be able to reach behind her back to T10 or higher for less difficulty with tucking in her shirt or bathing. progressing.        PLAN      [x]  Continue plan of care    [x]  Other:_ cont 1-2x/week for 4 weeks from 06/20     Yung Soria, PT DPT, [facility] 7/1/2019  1:04 PM       PT License #[id number]

## 2019-07-05 ENCOUNTER — HOSPITAL ENCOUNTER (OUTPATIENT)
Dept: PHYSICAL THERAPY | Age: 44
Discharge: HOME | End: 2019-07-05
Payer: COMMERCIAL

## 2019-07-05 PROCEDURE — 97140 MANUAL THERAPY 1/> REGIONS: CPT | Performed by: PHYSICAL THERAPY ASSISTANT

## 2019-07-05 PROCEDURE — 97110 THERAPEUTIC EXERCISES: CPT | Performed by: PHYSICAL THERAPY ASSISTANT

## 2019-07-05 NOTE — PROGRESS NOTES
PT DAILY TREATMENT NOTE 2-15    Patient Name: Kalpana Roque  Date:2019  : 1975  [x]  Patient  Verified  Payor: Hiram Roth / Plan: Micaela Carmona / Product Type: HMO /     In time:  10:33 AM Out time: 11:33 AM   Total Treatment Time (min)60   Total Timed Codes (min): 50   1:1 Treatment Time ([address] only) 40    Visit #: 14    Treatment Area: Pain in right shoulder [M25.511]    SUBJECTIVE  Pain Level (0-10 scale), subjective functional status/changes: Pt reports pain is 0/10 at rest, 4/10 during movement R shoulder. \"my motion is improving but I still get that pinch at that end range. \"     Any medication changes, allergies to medications, adverse drug reactions, diagnosis change, or new procedure performed?: [x] No    [] Yes (see summary sheet for update) SEE ABOVE. OBJECTIVE     R shoulder PROM flexion: 150 degrees    10' min Modality:      [x]  Ice     []  Heat       Position/location:    R shoulder end of session   Rationale: decrease pain to improve the patients ability to do functional activities   [x] Skin assessment post-treatment:  [x]intact []redness- no adverse reaction    []redness  adverse reaction:      25 min Therapeutic Exercise:  [x] See flow sheet:        Rationale: increase strength, improve coordination and increase proprioception to improve the patients ability to reach [place]    25 min Manual Therapy: inferior, posterior and anterior GH mobs. Inferior mobs with R GH joint in flexion, Passive ROM with stretch at end range flexion, abduction, IR and ER.      Rationale: decrease pain, increase tissue extensibility and decrease trigger points  to improve the patients ability to -          With   [x] TE   [] TA   [] neuro   [] other: Patient Education: [x] Review HEP    [] Progressed/Changed HEP based on:   [] positioning   [] body mechanics   [] transfers   [] heat/ice application    [x] other: encouraged patient to hold 10 seconds when performing supine cane flexion and ER for prolonged stretch as well as to perform wall slides in a hot shower to relax muscles and improve ROM. Pain Level (0-10 scale) post treatment: 0     ASSESSMENT/Changes in Function:    ROM steadily improving. Patient will continue to benefit from skilled PT services to modify and progress therapeutic interventions, address ROM deficits, address strength deficits, analyze and cue movement patterns, assess and modify postural abnormalities and instruct in home and community integration to attain remaining goals. Short Term Goals: To be accomplished in 2-3 weeks:              1) Pt independent in HEP from day 1 MET               2) Pt will be able to reach overhead to 140 deg or more of AROM without increased pain PROGRESSING     Long Term Goals: To be accomplished in 4-6 weeks:              1) Pt independent in final HEP. progressing               2) Pt will report she is able perform ADL's such as reaching to opposite underarm to apply deodorant, etc. progressing               3) Pt will be able to return to UE strengthening at the gym with modifications as needed from PT without increased pain. progressing              4) Pt will be able to reach behind her back to T10 or higher for less difficulty with tucking in her shirt or bathing. progressing.        PLAN      [x]  Continue plan of care    [x]  Other:_ cont 1-2x/week for 4 weeks from 06/20     Evangelista Valente PTA  7/5/2019  10:33 AM

## 2019-07-09 ENCOUNTER — HOSPITAL ENCOUNTER (OUTPATIENT)
Dept: PHYSICAL THERAPY | Age: 44
Discharge: HOME | End: 2019-07-09
Payer: COMMERCIAL

## 2019-07-09 PROCEDURE — 97140 MANUAL THERAPY 1/> REGIONS: CPT | Performed by: PHYSICAL THERAPIST

## 2019-07-09 PROCEDURE — 97110 THERAPEUTIC EXERCISES: CPT | Performed by: PHYSICAL THERAPIST

## 2019-07-09 NOTE — PROGRESS NOTES
PT DAILY TREATMENT NOTE 2-15    Patient Name: Larry Pearson  Date:2019  : 1975  [x]  Patient  Verified  Payor: Kamalalanny Mon / Plan: Leonardo Hamman / Product Type: HMO /     In time:  12:00  PM Out time: 110 PM   Total Treatment Time (min) 70   Total Timed Codes (min):  60   1:1 Treatment Time ([facility] only) 40    Visit #: 15    Treatment Area: Pain in right shoulder [M25.511]    SUBJECTIVE  Pain Level (0-10 scale), subjective functional status/changes: Pt reports pain is 0/10 at rest.  Pt reports still has pain / restrictions reaching up behind her back and with reaching across her body. She will see Dr. Lesvia Faust on Thursday. Any medication changes, allergies to medications, adverse drug reactions, diagnosis change, or new procedure performed?: [x] No    [] Yes (see summary sheet for update) SEE ABOVE. OBJECTIVE       10' min Modality:      [x]  Ice     []  Heat       Position/location:    R shoulder end of session   Rationale: decrease pain to improve the patients ability to do functional activities   [x] Skin assessment post-treatment:  [x]intact []redness- no adverse reaction    []redness  adverse reaction:      35 min Therapeutic Exercise:  [x] See flow sheet:        Rationale: increase strength, improve coordination and increase proprioception to improve the patients ability to reach [place]    25 min Manual Therapy: inferior, posterior and anterior GH mobs. Inferior mobs with R GH joint in flexion, Passive ROM with stretch at end range flexion, abduction, IR and ER.      Rationale: decrease pain, increase tissue extensibility and decrease trigger points  to improve the patients ability to -          With   [x] TE   [] TA   [] neuro   [] other: Patient Education: [x] Review HEP    [] Progressed/Changed HEP based on:   [] positioning   [] body mechanics   [] transfers   [] heat/ice application    [x] other: encouraged patient to hold 10 seconds when performing supine cane flexion and ER for prolonged stretch as well as to perform wall slides in a hot shower to relax muscles and improve ROM. Pain Level (0-10 scale) post treatment: 0     ASSESSMENT/Changes in Function:    Pt noting still most difficulty with reaching across her body and behind her back. She does not feel like her shoulder is near 100% and would like to continue PT if Dr. Andrew Bonner is in agreement as she feels it has been very helpful. Pt to see Dr. Andrew Bonner Thursday. Patient will continue to benefit from skilled PT services to modify and progress therapeutic interventions, address ROM deficits, address strength deficits, analyze and cue movement patterns, assess and modify postural abnormalities and instruct in home and community integration to attain remaining goals. Short Term Goals: To be accomplished in 2-3 weeks:              1) Pt independent in HEP from day 1 MET               2) Pt will be able to reach overhead to 140 deg or more of AROM without increased pain PROGRESSING     Long Term Goals: To be accomplished in 4-6 weeks:              1) Pt independent in final HEP. progressing               2) Pt will report she is able perform ADL's such as reaching to opposite underarm to apply deodorant, etc. progressing               3) Pt will be able to return to UE strengthening at the gym with modifications as needed from PT without increased pain. progressing              4) Pt will be able to reach behind her back to T10 or higher for less difficulty with tucking in her shirt or bathing. progressing. PLAN      [x]  Continue plan of care    [x]  Other:_ cont 1-2x/week for 4 weeks from 06/20. Pt will f/u with Dr. Andrew Bonner on Thursday.     Will Sellers, PT  7/9/2019  10:33 AM

## 2019-07-11 ENCOUNTER — HOSPITAL ENCOUNTER (OUTPATIENT)
Dept: PHYSICAL THERAPY | Age: 44
Discharge: HOME | End: 2019-07-11
Payer: COMMERCIAL

## 2019-07-11 PROCEDURE — 97140 MANUAL THERAPY 1/> REGIONS: CPT | Performed by: PHYSICAL THERAPIST

## 2019-07-11 PROCEDURE — 97110 THERAPEUTIC EXERCISES: CPT | Performed by: PHYSICAL THERAPIST

## 2019-07-11 NOTE — PROGRESS NOTES
PT Re-assessment / DAILY TREATMENT NOTE 2-15    Patient Name: Myrtle Oliver  Date:2019  : 1975  [x]  Patient  Verified  Payor: Maren Meter / Plan: Nila Field / Product Type: HMO /     In time:  6730  AM Out time: 110  Total Treatment Time (min) 70   Total Timed Codes (min):  60  1:1 Treatment Time ( [address] only) 55   Visit #: 16    Treatment Area: Pain in right shoulder [M25.511]    SUBJECTIVE  Pain Level (0-10 scale), subjective functional status/changes: Pt reports pain is 0/10 at rest.  Pt reports she tried on some coats and realized it is still difficult to don/doff a coat. Also, she is able to put on deodorant but she needs to use fingertips versus palm to use shaving cream.      She will see Dr. Avani Baker later today. Any medication changes, allergies to medications, adverse drug reactions, diagnosis change, or new procedure performed?: [x] No    [] Yes (see summary sheet for update) SEE ABOVE. OBJECTIVE     AROM:   R shoulder flexion 148 deg, ABD/Scaption 145 deg, Functional IR to T10    PROM:    R shoulder flexion 142 (limited due to pain/discomfort),  (no scaption), ER to 75 deg, IR to 52 deg. 10' min Modality:      [x]  Ice     []  Heat       Position/location:    R shoulder end of session   Rationale: decrease pain to improve the patients ability to do functional activities   [x] Skin assessment post-treatment:  [x]intact []redness- no adverse reaction    []redness  adverse reaction:      35 min Therapeutic Exercise:  [x] See flow sheet:   Re-assessment / measurements taken as she will see Dr. Avani Baker today. Rationale: increase strength, improve coordination and increase proprioception to improve the patients ability to reach [facility]    25 min Manual Therapy: inferior, posterior and anterior GH mobs. Inferior mobs with R GH joint in flexion, Passive ROM with stretch at end range flexion, abduction, IR and ER.      Rationale: decrease pain, increase tissue extensibility and decrease trigger points  to improve the patients ability to -          With   [x] TE   [] TA   [] neuro   [] other: Patient Education: [x] Review HEP    [] Progressed/Changed HEP based on:   [] positioning   [] body mechanics   [] transfers   [] heat/ice application    [x] other: encouraged patient to hold 10 seconds when performing supine cane flexion and ER for prolonged stretch as well as to perform wall slides in a hot shower to relax muscles and improve ROM. Pain Level (0-10 scale) post treatment: 0     ASSESSMENT/Changes in Function:    Pt with 16 skilled PT sessions at this time. Pt with cortisone injection a few weeks ago and ROM has been improving. Pt to f/u with Dr. Avani Baker this afternoon. Patient will continue to benefit from skilled PT services to modify and progress therapeutic interventions, address ROM deficits, address strength deficits, analyze and cue movement patterns, assess and modify postural abnormalities and instruct in home and community integration to attain remaining goals. Short Term Goals: To be accomplished in 2-3 weeks:              1) Pt independent in HEP from day 1 MET               2) Pt will be able to reach overhead to 140 deg or more of AROM without increased pain PROGRESSING     Long Term Goals: To be accomplished in 4-6 weeks:              1) Pt independent in final HEP. progressing               2) Pt will report she is able perform ADL's such as reaching to opposite underarm to apply deodorant, etc. MET - update to patient will be able to apply shaving cream to underarm using palm versus fingertips. 3) Pt will be able to return to UE strengthening at the gym with modifications as needed from PT without increased pain. progressing              4) Pt will be able to reach behind her back to T10 or higher for less difficulty with tucking in her shirt or bathing. progressing.        PLAN      [x]  Continue plan of care    [x]  Other:_ cont 1-2x/week for 4 weeks from 07/11. Pt will f/u with Dr. Suarez Memory today.     Tom Oliver, PT  7/11/2019  10:33 AM

## 2019-07-15 ENCOUNTER — HOSPITAL ENCOUNTER (OUTPATIENT)
Dept: PHYSICAL THERAPY | Age: 44
Discharge: HOME | End: 2019-07-15
Payer: COMMERCIAL

## 2019-07-15 PROCEDURE — 97140 MANUAL THERAPY 1/> REGIONS: CPT | Performed by: PHYSICAL THERAPIST

## 2019-07-15 PROCEDURE — 97110 THERAPEUTIC EXERCISES: CPT | Performed by: PHYSICAL THERAPIST

## 2019-07-15 NOTE — PROGRESS NOTES
PT  DAILY TREATMENT NOTE 2-15    Patient Name: Ander Zimmerman  Date:7/15/2019  : 1975  [x]  Patient  Verified  Payor: Suraj Esteban / Plan: Sudhir Kendall / Product Type: HMO /     In time:  230 PM Out time: 330  Total Treatment Time (min) 60   Total Timed Codes (min):  50  1:1 Treatment Time ([facility] only) 50   Visit #: 17    Treatment Area: Pain in right shoulder [M25.511]    SUBJECTIVE  Pain Level (0-10 scale), subjective functional status/changes: Pt reports pain is 0/10 at rest.    She saw Dr. Magalie Noguera last week and he recommended cont. PT, wrote a new Rx. He thought some of the restrictions she still had were to be expected. Any medication changes, allergies to medications, adverse drug reactions, diagnosis change, or new procedure performed?: [x] No    [] Yes (see summary sheet for update) SEE ABOVE. OBJECTIVE         10' min Modality:      [x]  Ice     []  Heat       Position/location:    R shoulder end of session   Rationale: decrease pain to improve the patients ability to do functional activities   [x] Skin assessment post-treatment:  [x]intact []redness- no adverse reaction    []redness  adverse reaction:      30 min Therapeutic Exercise:  [x] See flow sheet:      Rationale: increase strength, improve coordination and increase proprioception to improve the patients ability to reach [place]    20 min Manual Therapy: inferior, posterior and anterior GH mobs. Inferior mobs with R GH joint in flexion, Passive ROM with stretch at end range flexion, abduction, IR and ER.      Rationale: decrease pain, increase tissue extensibility and decrease trigger points  to improve the patients ability to -          With   [x] TE   [] TA   [] neuro   [] other: Patient Education: [x] Review HEP    [] Progressed/Changed HEP based on:   [] positioning   [] body mechanics   [] transfers   [] heat/ice application    [x] other: encouraged patient to hold 10 seconds when performing supine cane flexion and ER for prolonged stretch as well as to perform wall slides in a hot shower to relax muscles and improve ROM. Pain Level (0-10 scale) post treatment: 0     ASSESSMENT/Changes in Function:    Pt noting relief with manual therapy. Patient will continue to benefit from skilled PT services to modify and progress therapeutic interventions, address ROM deficits, address strength deficits, analyze and cue movement patterns, assess and modify postural abnormalities and instruct in home and community integration to attain remaining goals. Short Term Goals: To be accomplished in 2-3 weeks:              1) Pt independent in HEP from day 1 MET               2) Pt will be able to reach overhead to 140 deg or more of AROM without increased pain PROGRESSING     Long Term Goals: To be accomplished in 4-6 weeks:              1) Pt independent in final HEP. progressing               2) Pt will report she is able perform ADL's such as reaching to opposite underarm to apply deodorant, etc. MET - update to patient will be able to apply shaving cream to underarm using palm versus fingertips. 3) Pt will be able to return to UE strengthening at the gym with modifications as needed from PT without increased pain. progressing              4) Pt will be able to reach behind her back to T10 or higher for less difficulty with tucking in her shirt or bathing. progressing. PLAN      [x]  Continue plan of care    [x]  Other:_ cont 1-2x/week for 4 weeks from 07/11.       Taiwo Rojas, PT  7/15/2019  10:33 AM

## 2019-07-23 ENCOUNTER — HOSPITAL ENCOUNTER (OUTPATIENT)
Dept: PHYSICAL THERAPY | Age: 44
Discharge: HOME | End: 2019-07-23
Payer: COMMERCIAL

## 2019-07-23 PROCEDURE — 97140 MANUAL THERAPY 1/> REGIONS: CPT | Performed by: PHYSICAL THERAPIST

## 2019-07-23 PROCEDURE — 97110 THERAPEUTIC EXERCISES: CPT | Performed by: PHYSICAL THERAPIST

## 2019-07-23 NOTE — PROGRESS NOTES
PT  DAILY TREATMENT NOTE 2-15    Patient Name: Anita Santana  Date:2019  : 1975  [x]  Patient  Verified  Payor: Mady Alcaraz / Plan: Susanne Garg / Product Type: HMO /     In time:  1130 AM Out time: 1235  Total Treatment Time (min) 65   Total Timed Codes (min):  55  1:1 Treatment Time ([facility] only) 40   Visit #: 18    Treatment Area: Pain in right shoulder [M25.511]    SUBJECTIVE  Pain Level (0-10 scale), subjective functional status/changes: Pt reports pain is 0/10 at rest.    Pt reports she hasn't had a chance to do her exercises quite as much over the past few days as she was traveling for a wedding. Reaching across her body is still the most difficult for her to do. Any medication changes, allergies to medications, adverse drug reactions, diagnosis change, or new procedure performed?: [x] No    [] Yes (see summary sheet for update) SEE ABOVE. OBJECTIVE     10' min Modality:      [x]  Ice     []  Heat       Position/location:    R shoulder end of session   Rationale: decrease pain to improve the patients ability to do functional activities   [x] Skin assessment post-treatment:  [x]intact []redness- no adverse reaction    []redness  adverse reaction:      30 min Therapeutic Exercise:  [x] See flow sheet:      Rationale: increase strength, improve coordination and increase proprioception to improve the patients ability to reach [place]    25 min Manual Therapy: inferior, posterior and anterior GH mobs. Inferior mobs with R GH joint in flexion, Passive ROM with stretch at end range flexion, abduction, IR and ER.      Rationale: decrease pain, increase tissue extensibility and decrease trigger points  to improve the patients ability to -          With   [x] TE   [] TA   [] neuro   [] other: Patient Education: [x] Review HEP    [] Progressed/Changed HEP based on:   [] positioning   [] body mechanics   [] transfers   [] heat/ice application    [x] other: encouraged patient to hold 10 seconds when performing supine cane flexion and ER for prolonged stretch as well as to perform wall slides in a hot shower to relax muscles and improve ROM. Pain Level (0-10 scale) post treatment: 0     ASSESSMENT/Changes in Function:    Pt with less pain at end range with PROM today. Patient will continue to benefit from skilled PT services to modify and progress therapeutic interventions, address ROM deficits, address strength deficits, analyze and cue movement patterns, assess and modify postural abnormalities and instruct in home and community integration to attain remaining goals. Short Term Goals: To be accomplished in 2-3 weeks:              1) Pt independent in HEP from day 1 MET               2) Pt will be able to reach overhead to 140 deg or more of AROM without increased pain PROGRESSING     Long Term Goals: To be accomplished in 4-6 weeks:              1) Pt independent in final HEP. progressing               2) Pt will report she is able perform ADL's such as reaching to opposite underarm to apply deodorant, etc. MET - update to patient will be able to apply shaving cream to underarm using palm versus fingertips. 3) Pt will be able to return to UE strengthening at the gym with modifications as needed from PT without increased pain. progressing              4) Pt will be able to reach behind her back to T10 or higher for less difficulty with tucking in her shirt or bathing. progressing. PLAN      [x]  Continue plan of care    [x]  Other:_ cont 1-2x/week for 4 weeks from 07/11.       Elisabeth Kitchen, PT  7/23/2019  10:33 AM

## 2019-07-25 ENCOUNTER — HOSPITAL ENCOUNTER (OUTPATIENT)
Dept: PHYSICAL THERAPY | Age: 44
Discharge: HOME | End: 2019-07-25
Payer: COMMERCIAL

## 2019-07-25 PROCEDURE — 97110 THERAPEUTIC EXERCISES: CPT | Performed by: PHYSICAL THERAPIST

## 2019-07-25 PROCEDURE — 97140 MANUAL THERAPY 1/> REGIONS: CPT | Performed by: PHYSICAL THERAPIST

## 2019-07-25 NOTE — PROGRESS NOTES
PT  DAILY TREATMENT NOTE 2-15    Patient Name: Sofi Alvarez  Date:2019  : 1975  [x]  Patient  Verified   Payor: Richard Ruiz / Plan: Alex Ramirez / Product Type: HMO /     In time: 1000 AM Out time: 1105  Total Treatment Time (min)  65  Total Timed Codes (min):  55  1:1 Treatment Time ([address] only) 40     Visit #: 19    Treatment Area: Pain in right shoulder [M25.511]    SUBJECTIVE  Pain Level (0-10 scale), subjective functional status/changes: Pt reports 0/10. Pt reports she has been doing triceps by her side, bicep curls and rows without difficulty. She would like to know which other exercises are safe for her to return to. She was doing a bench press before and other strengthening exercises. Any medication changes, allergies to medications, adverse drug reactions, diagnosis change, or new procedure performed?: [x] No    [] Yes (see summary sheet for update) SEE ABOVE. OBJECTIVE     10' min Modality:      [x]  Ice     []  Heat       Position/location:    R shoulder end of session   Rationale: decrease pain to improve the patients ability to do functional activities   [x] Skin assessment post-treatment:  [x]intact []redness- no adverse reaction    []redness  adverse reaction:      30 min Therapeutic Exercise:  [x] See flow sheet:   Added strengthening including modified lat pull down and prone row and shoulder extension. Discussed she could try chest press with 3 lb weights on the floor versus using bench and a bar. Rationale: increase strength, improve coordination and increase proprioception to improve the patients ability to reach [facility]    25 min Manual Therapy: inferior, posterior and anterior GH mobs. Inferior mobs with R GH joint in flexion, Passive ROM with stretch at end range flexion, abduction, IR and ER.      Rationale: decrease pain, increase tissue extensibility and decrease trigger points  to improve the patients ability to -          With   [x] TE   [] TA   [] neuro   [] other: Patient Education: [x] Review HEP    [] Progressed/Changed HEP based on:   [] positioning   [] body mechanics   [] transfers   [] heat/ice application    [x] other: encouraged patient to hold 10 seconds when performing supine cane flexion and ER for prolonged stretch as well as to perform wall slides in a hot shower to relax muscles and improve ROM. Pain Level (0-10 scale) post treatment: 0     ASSESSMENT/Changes in Function:    Pt did well with addition of strengthening exercises as noted in flow sheet, advised she can add back into her gym program.   Patient will continue to benefit from skilled PT services to modify and progress therapeutic interventions, address ROM deficits, address strength deficits, analyze and cue movement patterns, assess and modify postural abnormalities and instruct in home and community integration to attain remaining goals. Short Term Goals: To be accomplished in 2-3 weeks:              1) Pt independent in HEP from day 1 MET               2) Pt will be able to reach overhead to 140 deg or more of AROM without increased pain PROGRESSING     Long Term Goals: To be accomplished in 4-6 weeks:              1) Pt independent in final HEP. progressing               2) Pt will report she is able perform ADL's such as reaching to opposite underarm to apply deodorant, etc. MET - update to patient will be able to apply shaving cream to underarm using palm versus fingertips. 3) Pt will be able to return to UE strengthening at the gym with modifications as needed from PT without increased pain. progressing              4) Pt will be able to reach behind her back to T10 or higher for less difficulty with tucking in her shirt or bathing. progressing. PLAN      [x]  Continue plan of care    [x]  Other:_ cont 1-2x/week for 4 weeks from 07/11.       Rimma Perez, PT  7/25/2019  10:33 AM

## 2019-07-30 ENCOUNTER — HOSPITAL ENCOUNTER (OUTPATIENT)
Dept: PHYSICAL THERAPY | Age: 44
Discharge: HOME | End: 2019-07-30
Payer: COMMERCIAL

## 2019-07-30 PROCEDURE — 97110 THERAPEUTIC EXERCISES: CPT | Performed by: PHYSICAL THERAPY ASSISTANT

## 2019-07-30 PROCEDURE — 97140 MANUAL THERAPY 1/> REGIONS: CPT | Performed by: PHYSICAL THERAPY ASSISTANT

## 2019-07-30 NOTE — PROGRESS NOTES
PT  DAILY TREATMENT NOTE 2-15    Patient Name: Gianni Tobin  Date:2019  : 1975  [x]  Patient  Verified   Payor: Moe Aquino / Plan: Volodymyr Pancoast / Product Type: HMO /     In time: 7:30 AM Out time: 8:40  Total Treatment Time (min)  70  Total Timed Codes (min):  60  1:1 Treatment Time ([address] only) 60     Visit #: 20    Treatment Area: Pain in right shoulder [M25.511]    SUBJECTIVE  Pain Level (0-10 scale), subjective functional status/changes: Pt reports 0/10. States doing well with the exercises that were recommended at the gym last visit, greatest challenge is reaching across her body to apply deodorant or shave under L arm. Any medication changes, allergies to medications, adverse drug reactions, diagnosis change, or new procedure performed?: [x] No    [] Yes (see summary sheet for update) SEE ABOVE. OBJECTIVE     10' min Modality:      [x]  Ice     []  Heat       Position/location:    R shoulder end of session   Rationale: decrease pain to improve the patients ability to do functional activities   [x] Skin assessment post-treatment:  [x]intact []redness- no adverse reaction    []redness  adverse reaction:      30 min Therapeutic Exercise:  [x] See flow sheet:      Rationale: increase strength, improve coordination and increase proprioception to improve the patients ability to reach [place]    30 min Manual Therapy: inferior, posterior and anterior GH mobs. Inferior mobs with R GH joint in flexion, Passive ROM with stretch at end range flexion, abduction, IR and ER.      Rationale: decrease pain, increase tissue extensibility and decrease trigger points  to improve the patients ability to -          With   [x] TE   [] TA   [] neuro   [] other: Patient Education: [x] Review HEP    [] Progressed/Changed HEP based on:   [] positioning   [] body mechanics   [] transfers   [] heat/ice application    [x] other:       Pain Level (0-10 scale) post treatment: 0     ASSESSMENT/Changes in Function: ROM steadily improving. Patient will continue to benefit from skilled PT services to modify and progress therapeutic interventions, address ROM deficits, address strength deficits, analyze and cue movement patterns, assess and modify postural abnormalities and instruct in home and community integration to attain remaining goals. Short Term Goals: To be accomplished in 2-3 weeks:              1) Pt independent in HEP from day 1 MET               2) Pt will be able to reach overhead to 140 deg or more of AROM without increased pain PROGRESSING     Long Term Goals: To be accomplished in 4-6 weeks:              1) Pt independent in final HEP. progressing               2) Pt will report she is able perform ADL's such as reaching to opposite underarm to apply deodorant, etc. MET - update to patient will be able to apply shaving cream to underarm using palm versus fingertips. 3) Pt will be able to return to UE strengthening at the gym with modifications as needed from PT without increased pain. progressing              4) Pt will be able to reach behind her back to T10 or higher for less difficulty with tucking in her shirt or bathing. progressing. PLAN      [x]  Continue plan of care    [x]  Other:_ cont 1-2x/week for 4 weeks from 07/11.       Evangelista Valente, LEWIS  7/30/2019  7:30  AM

## 2019-08-01 ENCOUNTER — HOSPITAL ENCOUNTER (OUTPATIENT)
Dept: PHYSICAL THERAPY | Age: 44
Discharge: HOME | End: 2019-08-01
Payer: COMMERCIAL

## 2019-08-01 PROCEDURE — 97140 MANUAL THERAPY 1/> REGIONS: CPT | Performed by: PHYSICAL THERAPY ASSISTANT

## 2019-08-01 PROCEDURE — 97110 THERAPEUTIC EXERCISES: CPT | Performed by: PHYSICAL THERAPY ASSISTANT

## 2019-08-01 NOTE — PROGRESS NOTES
PT  DAILY TREATMENT NOTE 2-15    Patient Name: Eulalio Gutierrez  Date:2019  : 1975  [x]  Patient  Verified   Payor: Bozena Sousa / Plan: Derick Connor / Product Type: HMO /     In time: 10:00 AM Out time: 10:10 AM  Total Treatment Time (min)  70  Total Timed Codes (min):  60  1:1 Treatment Time ([address] only) 60     Visit #: 21    Treatment Area: Pain in right shoulder [M25.511]    SUBJECTIVE  Pain Level (0-10 scale), subjective functional status/changes: Pt reports 0/10. Greatest challenged is horizontal adduction (applying Deoderant to contralateral side or IR to fasten her bra. Overall she is able to sleep throughout the night now and was able to stretch this morning without really noticing her R shoulder. \"it felt more normal.\" States she will be out of town next week as she will be on vacation at [address] [place] [address]. Any medication changes, allergies to medications, adverse drug reactions, diagnosis change, or new procedure performed?: [x] No    [] Yes (see summary sheet for update) SEE ABOVE. OBJECTIVE     10' min Modality:      [x]  Ice     []  Heat       Position/location:    R shoulder end of session   Rationale: decrease pain to improve the patients ability to do functional activities   [x] Skin assessment post-treatment:  [x]intact []redness- no adverse reaction    []redness  adverse reaction:      30 min Therapeutic Exercise:  [x] See flow sheet:   Added towel IR stretch   Rationale: increase strength, improve coordination and increase proprioception to improve the patients ability to reach [place]    30 min Manual Therapy: inferior, posterior and anterior GH mobs. Inferior mobs with R GH joint in flexion, Passive ROM with stretch at end range flexion, abduction, IR and ER.      Rationale: decrease pain, increase tissue extensibility and decrease trigger points  to improve the patients ability to -          With   [x] TE   [] TA   [] neuro   [] other: Patient Education: [x] Review HEP    [x] Progressed/Changed HEP based on: Towel IR stretch  [] positioning   [] body mechanics   [] transfers   [] heat/ice application    [x] other:       Pain Level (0-10 scale) post treatment: 0     ASSESSMENT/Changes in Function:    Greatest limitation is P/AROM shoulder IR, added towel IR stretch to HEP. Patient will continue to benefit from skilled PT services to modify and progress therapeutic interventions, address ROM deficits, address strength deficits, analyze and cue movement patterns, assess and modify postural abnormalities and instruct in home and community integration to attain remaining goals. Short Term Goals: To be accomplished in 2-3 weeks:              1) Pt independent in HEP from day 1 MET               2) Pt will be able to reach overhead to 140 deg or more of AROM without increased pain PROGRESSING     Long Term Goals: To be accomplished in 4-6 weeks:              1) Pt independent in final HEP. progressing               2) Pt will report she is able perform ADL's such as reaching to opposite underarm to apply deodorant, etc. MET - update to patient will be able to apply shaving cream to underarm using palm versus fingertips. 3) Pt will be able to return to UE strengthening at the gym with modifications as needed from PT without increased pain. progressing              4) Pt will be able to reach behind her back to T10 or higher for less difficulty with tucking in her shirt or bathing. progressing. PLAN      [x]  Continue plan of care cont 1-2x/week for 4 weeks from 07/11    [x]  Other:_ patient will be out of town for 1 week, will resume PT when she returns from vacation.      Maciej Alva, PTA  8/1/2019  10:00  AM

## 2019-08-12 ENCOUNTER — APPOINTMENT (OUTPATIENT)
Dept: PHYSICAL THERAPY | Age: 44
End: 2019-08-12
Payer: COMMERCIAL

## 2019-08-13 ENCOUNTER — HOSPITAL ENCOUNTER (OUTPATIENT)
Dept: PHYSICAL THERAPY | Age: 44
Discharge: HOME | End: 2019-08-13
Payer: COMMERCIAL

## 2019-08-13 PROCEDURE — 97110 THERAPEUTIC EXERCISES: CPT | Performed by: PHYSICAL THERAPIST

## 2019-08-13 PROCEDURE — 97140 MANUAL THERAPY 1/> REGIONS: CPT | Performed by: PHYSICAL THERAPIST

## 2019-08-13 NOTE — PROGRESS NOTES
PT  DAILY TREATMENT NOTE 2-15    Patient Name: Kilo Maldonado  Date:2019  : 1975  [x]  Patient  Verified   Payor: Yoli Seo / Plan: Nolan Carry / Product Type: HMO /     In time: 908 PM Out time: 545   Total Treatment Time (min)  70  Total Timed Codes (min):  60  1:1 Treatment Time ([facility] only) 40     Visit #: 22    Treatment Area: Pain in right shoulder [M25.511]    SUBJECTIVE  Pain Level (0-10 scale), subjective functional status/changes: Pt reports 0/10. Pt reports she is still having most limitation with reaching behind her back and across her body. She try going wake boarding while on vacation and tubing and felt fine with this. She does feel like her right arm is still weak. Any medication changes, allergies to medications, adverse drug reactions, diagnosis change, or new procedure performed?: [x] No    [] Yes (see summary sheet for update) SEE ABOVE. OBJECTIVE     10' min Modality:      [x]  Ice     []  Heat       Position/location:    R shoulder end of session   Rationale: decrease pain to improve the patients ability to do functional activities   [x] Skin assessment post-treatment:  [x]intact []redness- no adverse reaction    []redness  adverse reaction:      30 min Therapeutic Exercise:  [x] See flow sheet:   Progressed per flow sheet. Rationale: increase strength, improve coordination and increase proprioception to improve the patients ability to reach [place]    30 min Manual Therapy: inferior, posterior and anterior GH mobs. Inferior mobs with R GH joint in flexion, Passive ROM with stretch at end range flexion, abduction, IR and ER. Rationale: decrease pain, increase tissue extensibility and decrease trigger points  to improve the patients ability to -          With   [x] TE   [] TA   [] neuro ot[] other: Patient Education: [x] Review HEP    [x] Progressed/Changed HEP based on:  Towel IR stretch  [] positioning   [] body mechanics   [] transfers   [] heat/ice application    [x] other:       Pain Level (0-10 scale) post treatment: 0     ASSESSMENT/Changes in Function:    Pt tolerated progression in ther. Ex. Well, progressed strengthening exercises. Patient will continue to benefit from skilled PT services to modify and progress therapeutic interventions, address ROM deficits, address strength deficits, analyze and cue movement patterns, assess and modify postural abnormalities and instruct in home and community integration to attain remaining goals. Short Term Goals: To be accomplished in 2-3 weeks:              1) Pt independent in HEP from day 1 MET               2) Pt will be able to reach overhead to 140 deg or more of AROM without increased pain PROGRESSING     Long Term Goals: To be accomplished in 4-6 weeks:              1) Pt independent in final HEP. progressing               2) Pt will report she is able perform ADL's such as reaching to opposite underarm to apply deodorant, etc. MET - update to patient will be able to apply shaving cream to underarm using palm versus fingertips. 3) Pt will be able to return to UE strengthening at the gym with modifications as needed from PT without increased pain. progressing              4) Pt will be able to reach behind her back to T10 or higher for less difficulty with tucking in her shirt or bathing. progressing.        PLAN      [x]  Continue plan of care cont 1-2x/week for 4 weeks from 07/11    [x]  Other: re-assess    Luke Ferrell, PT  8/13/2019  10:00  AM

## 2019-08-15 ENCOUNTER — HOSPITAL ENCOUNTER (OUTPATIENT)
Dept: PHYSICAL THERAPY | Age: 44
Discharge: HOME | End: 2019-08-15
Payer: COMMERCIAL

## 2019-08-15 PROCEDURE — 97110 THERAPEUTIC EXERCISES: CPT | Performed by: PHYSICAL THERAPIST

## 2019-08-15 PROCEDURE — 97140 MANUAL THERAPY 1/> REGIONS: CPT | Performed by: PHYSICAL THERAPIST

## 2019-08-20 ENCOUNTER — HOSPITAL ENCOUNTER (OUTPATIENT)
Dept: PHYSICAL THERAPY | Age: 44
Discharge: HOME | End: 2019-08-20
Payer: COMMERCIAL

## 2019-08-20 PROCEDURE — 97110 THERAPEUTIC EXERCISES: CPT | Performed by: PHYSICAL THERAPIST

## 2019-08-20 PROCEDURE — 97140 MANUAL THERAPY 1/> REGIONS: CPT | Performed by: PHYSICAL THERAPIST

## 2019-08-20 NOTE — PROGRESS NOTES
PT  DAILY TREATMENT NOTE 2-15    Patient Name: Reyes Jones  Date:2019  : 1975  [x]  Patient  Verified   Payor: Magali Rodriges / Plan: Pa Vila / Product Type: HMO /     In time: 430 PM Out time: 545  Total Treatment Time (min)  75  Total Timed Codes (min):  65  1:1 Treatment Time ([address] only) 40     Visit #: 24     Treatment Area: Pain in right shoulder [M25.511]    SUBJECTIVE  Pain Level (0-10 scale), subjective functional status/changes: Pt reports 0/10. Pt reports that she continues to have the most difficulty reaching across her body but overall she is feeling much better. She has been able to increase resistance in some of her exercises (bicep curls) at the gym without increased pain. Any medication changes, allergies to medications, adverse drug reactions, diagnosis change, or new procedure performed?: [x] No    [] Yes (see summary sheet for update) SEE ABOVE. OBJECTIVE          10' min Modality:      [x]  Ice     []  Heat        Position/location:    R shoulder end of session   Rationale: decrease pain to improve the patients ability to do functional activities   [x] Skin assessment post-treatment:  [x]intact []redness- no adverse reaction    []redness  adverse reaction:      35 min Therapeutic Exercise:  [x] See flow sheet:   Progressed per flow sheet. Rationale: increase strength, improve coordination and increase proprioception to improve the patients ability to reach [place]    30 min Manual Therapy: inferior, posterior GH mobs. Passive ROM with stretch at end range flexion, abduction, IR and ER. Rationale: decrease pain, increase tissue extensibility and decrease trigger points  to improve the patients ability to -          With   [x] TE   [] TA   [] neuro ot[] other: Patient Education: [x] Review HEP    [x] Progressed/Changed HEP based on:  Towel IR stretch  [] positioning   [] body mechanics   [] transfers   [] heat/ice application    [x] other:       Pain Level (0-10 scale) post treatment:0     ASSESSMENT/Changes in Function:    Pt notes ability to progress in her gym program without increased pain. We discussed decreasing frequency of PT today and pt to update her schedule. Patient will continue to benefit from skilled PT services to modify and progress therapeutic interventions, address ROM deficits, address strength deficits, analyze and cue movement patterns, assess and modify postural abnormalities and instruct in home and community integration to attain remaining goals. Short Term Goals: To be accomplished in 2-3 weeks:              1) Pt independent in HEP from day 1 MET               2) Pt will be able to reach overhead to 140 deg or more of AROM without increased pain MET     Long Term Goals: To be accomplished in 4-6 weeks:              1) Pt independent in final HEP. progressing               2) Pt will report she is able perform ADL's such as reaching to opposite underarm to apply deodorant, etc. MET - update to patient will be able to apply shaving cream to underarm using palm versus fingertips. 3) Pt will be able to return to UE strengthening at the gym with modifications as needed from PT without increased pain. progressing              4) Pt will be able to reach behind her back to T10 or higher for less difficulty with tucking in her shirt or bathing. progressing.        PLAN      [x]  Continue plan of care cont 1-2x/week for 4 weeks from 08/15    []  Other:     Elham Boyer, PT  8/20/2019  10:00  AM

## 2019-08-22 ENCOUNTER — HOSPITAL ENCOUNTER (OUTPATIENT)
Dept: PHYSICAL THERAPY | Age: 44
Discharge: HOME | End: 2019-08-22
Payer: COMMERCIAL

## 2019-08-22 PROCEDURE — 97110 THERAPEUTIC EXERCISES: CPT | Performed by: PHYSICAL THERAPIST

## 2019-08-22 PROCEDURE — 97140 MANUAL THERAPY 1/> REGIONS: CPT | Performed by: PHYSICAL THERAPIST

## 2019-08-22 NOTE — PROGRESS NOTES
PT  DAILY TREATMENT NOTE 2-15    Patient Name: Myrtle Oliver  Date:2019  : 1975  [x]  Patient  Verified   Payor: Maren Meter / Plan: Nila Field / Product Type: HMO /     In time: 1200 PM Out time: 115   Total Treatment Time (min)  75  Total Timed Codes (min):  65  1:1 Treatment Time ( [address] only)40      Visit #: 25    Treatment Area: Pain in right shoulder [M25.511]    SUBJECTIVE  Pain Level (0-10 scale), subjective functional status/changes: Pt reports 0/10. Pt reports she may cancel Tuesday next week due to a meeting and go to once/week. Pt reports she is wondering how long it may take to get her shoulder back to normal, 2 years from injection or from when she started PT? Any medication changes, allergies to medications, adverse drug reactions, diagnosis change, or new procedure performed?: [x] No    [] Yes (see summary sheet for update) SEE ABOVE. OBJECTIVE          10' min Modality:      [x]  Ice     []  Heat        Position/location:    R shoulder end of session   Rationale: decrease pain to improve the patients ability to do functional activities   [x] Skin assessment post-treatment:  [x]intact []redness- no adverse reaction    []redness  adverse reaction:      35 min Therapeutic Exercise:  [x] See flow sheet: added S/L ER. Rationale: increase strength, improve coordination and increase proprioception to improve the patients ability to reach [place]    30 min Manual Therapy: inferior, posterior GH mobs. Passive ROM with stretch at end range flexion,  IR and ER. Rationale: decrease pain, increase tissue extensibility and decrease trigger points  to improve the patients ability to reach [facility]          With   [x] TE   [] TA   [] neuro ot[] other: Patient Education: [x] Review HEP    [x] Progressed/Changed HEP based on:  Towel IR stretch  [] positioning   [] body mechanics   [] transfers   [] heat/ice application    [x] other:       Pain Level (0-10 scale) post treatment:0 ASSESSMENT/Changes in Function:    Discussed timeline of adhesive capsulitis and possibility of taking > 1 year for full recovery. Discussed her HEP with her as well and to perform ther. Ex. Outside of PT when dropping down to 1x/week. Patient will continue to benefit from skilled PT services to modify and progress therapeutic interventions, address ROM deficits, address strength deficits, analyze and cue movement patterns, assess and modify postural abnormalities and instruct in home and community integration to attain remaining goals. Short Term Goals: To be accomplished in 2-3 weeks:              1) Pt independent in HEP from day 1 MET               2) Pt will be able to reach overhead to 140 deg or more of AROM without increased pain MET     Long Term Goals: To be accomplished in 4-6 weeks:              1) Pt independent in final HEP. progressing               2) Pt will report she is able perform ADL's such as reaching to opposite underarm to apply deodorant, etc. MET - update to patient will be able to apply shaving cream to underarm using palm versus fingertips. 3) Pt will be able to return to UE strengthening at the gym with modifications as needed from PT without increased pain. progressing              4) Pt will be able to reach behind her back to T10 or higher for less difficulty with tucking in her shirt or bathing. progressing.        PLAN      [x]  Continue plan of care cont 1-2x/week for 4 weeks from 08/15    []  Other:     Cammie Rondon, PT  8/22/2019  10:00  AM

## 2019-08-27 ENCOUNTER — APPOINTMENT (OUTPATIENT)
Dept: PHYSICAL THERAPY | Age: 44
End: 2019-08-27
Payer: COMMERCIAL

## 2019-08-29 ENCOUNTER — HOSPITAL ENCOUNTER (OUTPATIENT)
Dept: PHYSICAL THERAPY | Age: 44
Discharge: HOME | End: 2019-08-29
Payer: COMMERCIAL

## 2019-08-29 PROCEDURE — 97110 THERAPEUTIC EXERCISES: CPT | Performed by: PHYSICAL THERAPIST

## 2019-08-29 PROCEDURE — 97140 MANUAL THERAPY 1/> REGIONS: CPT | Performed by: PHYSICAL THERAPIST

## 2019-08-29 NOTE — PROGRESS NOTES
PT  DAILY TREATMENT NOTE 2-15    Patient Name: Myrtle Oliver  Date:2019  : 1975  [x]  Patient  Verified   Payor: Maren Meter / Plan: Nila Field / Product Type: HMO /     In time: 5:00 PM Out time:  610  Total Treatment Time (min)  70  Total Timed Codes (min):  60  1:1 Treatment Time ([address] only) 40     Visit #: 26    Treatment Area: Pain in right shoulder [M25.511]    SUBJECTIVE  Pain Level (0-10 scale), subjective functional status/changes: Pt reports 0/10 pain. Pt reports since back at work/school she hasn't had as much time to do her exercises. Any medication changes, allergies to medications, adverse drug reactions, diagnosis change, or new procedure performed?: [x] No    [] Yes (see summary sheet for update) SEE ABOVE. OBJECTIVE          10' min Modality:      [x]  Ice     []  Heat        Position/location:    R shoulder end of session   Rationale: decrease pain to improve the patients ability to do functional activities   [x] Skin assessment post-treatment:  [x]intact []redness- no adverse reaction    []redness  adverse reaction:      30 min Therapeutic Exercise:  [x] See flow sheet: -     Rationale: increase strength, improve coordination and increase proprioception to improve the patients ability to reach [place]    30 min Manual Therapy: inferior, posterior GH mobs. Passive ROM with stretch at end range flexion,  IR and ER. Rationale: decrease pain, increase tissue extensibility and decrease trigger points  to improve the patients ability to reach [facility]          With   [x] TE   [] TA   [] neuro ot[] other: Patient Education: [x] Review HEP    [x] Progressed/Changed HEP based on:  Towel IR stretch  [] positioning   [] body mechanics   [] transfers   [] heat/ice application    [x] other:       Pain Level (0-10 scale) post treatment:0     ASSESSMENT/Changes in Function:      Patient will continue to benefit from skilled PT services to modify and progress therapeutic interventions, address ROM deficits, address strength deficits, analyze and cue movement patterns, assess and modify postural abnormalities and instruct in home and community integration to attain remaining goals. Short Term Goals: To be accomplished in 2-3 weeks:              1) Pt independent in HEP from day 1 MET               2) Pt will be able to reach overhead to 140 deg or more of AROM without increased pain MET     Long Term Goals: To be accomplished in 4-6 weeks:              1) Pt independent in final HEP. progressing               2) Pt will report she is able perform ADL's such as reaching to opposite underarm to apply deodorant, etc. MET - update to patient will be able to apply shaving cream to underarm using palm versus fingertips. 3) Pt will be able to return to UE strengthening at the gym with modifications as needed from PT without increased pain. progressing              4) Pt will be able to reach behind her back to T10 or higher for less difficulty with tucking in her shirt or bathing. progressing.        PLAN      [x]  Continue plan of care cont 1-2x/week for 4 weeks from 08/15    []  Other:     Terrance Castillo, PT  8/29/2019  10:00  AM

## 2019-09-03 ENCOUNTER — APPOINTMENT (OUTPATIENT)
Dept: PHYSICAL THERAPY | Age: 44
End: 2019-09-03
Payer: COMMERCIAL

## 2019-09-05 ENCOUNTER — HOSPITAL ENCOUNTER (OUTPATIENT)
Dept: PHYSICAL THERAPY | Age: 44
Discharge: HOME | End: 2019-09-05
Payer: COMMERCIAL

## 2019-09-05 PROCEDURE — 97110 THERAPEUTIC EXERCISES: CPT | Performed by: PHYSICAL THERAPIST

## 2019-09-05 PROCEDURE — 97140 MANUAL THERAPY 1/> REGIONS: CPT | Performed by: PHYSICAL THERAPIST

## 2019-09-05 NOTE — PROGRESS NOTES
PT  DAILY TREATMENT NOTE 2-15    Patient Name: Jose Juan Townsend  Date:2019  : 1975  [x]  Patient  Verified   Payor: Lannyerrol Vasquez / Plan: Ananth Escobar / Product Type: HMO /     In time: 4:00  PM Out time:  520  Total Treatment Time (min)  80  Total Timed Codes (min):  70  1:1 Treatment Time ([address] only) 55     Visit #: 24    Treatment Area: Pain in right shoulder [M25.511]    SUBJECTIVE  Pain Level (0-10 scale), subjective functional status/changes: Pt reports 0/10 pain. Pt reports reaching behind her back and across her body are still the most challenging. Any medication changes, allergies to medications, adverse drug reactions, diagnosis change, or new procedure performed?: [x] No    [] Yes (see summary sheet for update) SEE ABOVE. OBJECTIVE          10' min Modality:      [x]  Ice     []  Heat        Position/location:    R shoulder end of session   Rationale: decrease pain to improve the patients ability to do functional activities   [x] Skin assessment post-treatment:  [x]intact []redness- no adverse reaction    []redness  adverse reaction:      40 min Therapeutic Exercise:  [x] See flow sheet: -     Rationale: increase strength, improve coordination and increase proprioception to improve the patients ability to reach [place]    30 min Manual Therapy: inferior, posterior GH mobs. Passive ROM with stretch at end range flexion,  IR and ER. Rationale: decrease pain, increase tissue extensibility and decrease trigger points  to improve the patients ability to reach           With   [x] TE   [] TA   [] neuro ot[] other: Patient Education: [x] Review HEP    [x] Progressed/Changed HEP based on: Towel IR stretch  [] positioning   [] body mechanics   [] transfers   [] heat/ice application    [x] other:       Pain Level (0-10 scale) post treatment:0     ASSESSMENT/Changes in Function:    Pt continues to be most limited in shoulder IR but overall has progressed well.    Patient will continue to benefit from skilled PT services to modify and progress therapeutic interventions, address ROM deficits, address strength deficits, analyze and cue movement patterns, assess and modify postural abnormalities and instruct in home and community integration to attain remaining goals. Short Term Goals: To be accomplished in 2-3 weeks:              1) Pt independent in HEP from day 1 MET               2) Pt will be able to reach overhead to 140 deg or more of AROM without increased pain MET     Long Term Goals: To be accomplished in 4-6 weeks:              1) Pt independent in final HEP. progressing               2) Pt will report she is able perform ADL's such as reaching to opposite underarm to apply deodorant, etc. MET - update to patient will be able to apply shaving cream to underarm using palm versus fingertips. 3) Pt will be able to return to UE strengthening at the gym with modifications as needed from PT without increased pain. progressing              4) Pt will be able to reach behind her back to T10 or higher for less difficulty with tucking in her shirt or bathing. progressing.        PLAN      [x]  Continue plan of care cont 1-2x/week for 4 weeks from 08/15    []  Other:     Nahid Carrillo, PT  9/5/2019  10:00  AM

## 2019-09-12 ENCOUNTER — HOSPITAL ENCOUNTER (OUTPATIENT)
Dept: PHYSICAL THERAPY | Age: 44
Discharge: HOME | End: 2019-09-12
Payer: COMMERCIAL

## 2019-09-12 PROCEDURE — 97110 THERAPEUTIC EXERCISES: CPT | Performed by: PHYSICAL THERAPIST

## 2019-09-12 PROCEDURE — 97140 MANUAL THERAPY 1/> REGIONS: CPT | Performed by: PHYSICAL THERAPIST

## 2019-09-12 NOTE — PROGRESS NOTES
PT  DAILY TREATMENT NOTE 2-15    Patient Name: Larry Pearson  Date:2019  : 1975  [x]  Patient  Verified   Payor: Karen Velasquez / Plan: Leonardo Hamman / Product Type: HMO /     In time: 4:30  PM Out time:  540  Total Treatment Time (min)  70  Total Timed Codes (min):  70  1:1 Treatment Time ([address] only)60     Visit #: 28    Treatment Area: Pain in right shoulder [M25.511]    SUBJECTIVE  Pain Level (0-10 scale), subjective functional status/changes: Pt reports 0/10 pain. Pt reports that she has still been having some back pain when changing positions at night or laying on her stomach. Her shoulder is still the most stiff when reaching behind her back or across her body. Pt notes she was able to increase resistance in bicep curls at the gym without any pain. Any medication changes, allergies to medications, adverse drug reactions, diagnosis change, or new procedure performed?: [x] No    [] Yes (see summary sheet for update) SEE ABOVE. OBJECTIVE          Pt to ice at home min Modality:      [x]  Ice     []  Heat        Position/location:    R shoulder end of session   Rationale: decrease pain to improve the patients ability to do functional activities   [x] Skin assessment post-treatment:  [x]intact []redness- no adverse reaction    []redness  adverse reaction:      40 min Therapeutic Exercise:  [x] See flow sheet: Increased S/L ER to 2 lb   Rationale: increase strength, improve coordination and increase proprioception to improve the patients ability to reach [place]    30 min Manual Therapy: inferior, posterior GH mobs. Passive ROM with stretch at end range flexion,  IR and ER. Rationale: decrease pain, increase tissue extensibility and decrease trigger points  to improve the patients ability to reach [facility]          With   [x] TE   [] TA   [] neuro ot[] other: Patient Education: [x] Review HEP    [x] Progressed/Changed HEP based on:  Towel IR stretch  [] positioning   [] body mechanics   [] transfers   [] heat/ice application    [x] other:       Pain Level (0-10 scale) post treatment:0     ASSESSMENT/Changes in Function:    Pt continues to note most limitation in horizontal adduction and IR. Patient will continue to benefit from skilled PT services to modify and progress therapeutic interventions, address ROM deficits, address strength deficits, analyze and cue movement patterns, assess and modify postural abnormalities and instruct in home and community integration to attain remaining goals. Short Term Goals: To be accomplished in 2-3 weeks:              1) Pt independent in HEP from day 1 MET               2) Pt will be able to reach overhead to 140 deg or more of AROM without increased pain MET     Long Term Goals: To be accomplished in 4-6 weeks:              1) Pt independent in final HEP. progressing               2) Pt will report she is able perform ADL's such as reaching to opposite underarm to apply deodorant, etc. MET - update to patient will be able to apply shaving cream to underarm using palm versus fingertips. 3) Pt will be able to return to UE strengthening at the gym with modifications as needed from PT without increased pain. progressing              4) Pt will be able to reach behind her back to T10 or higher for less difficulty with tucking in her shirt or bathing. progressing.        PLAN      [x]  Continue plan of care cont 1-2x/week for 4 weeks from 08/15    [x]  Other: re-assessment    Nacho Moore, PT  9/12/2019  10:00  AM

## 2019-09-16 ENCOUNTER — HOSPITAL ENCOUNTER (OUTPATIENT)
Dept: PHYSICAL THERAPY | Age: 44
Discharge: HOME | End: 2019-09-16
Payer: COMMERCIAL

## 2019-09-16 PROCEDURE — 97140 MANUAL THERAPY 1/> REGIONS: CPT | Performed by: PHYSICAL THERAPIST

## 2019-09-16 PROCEDURE — 97110 THERAPEUTIC EXERCISES: CPT | Performed by: PHYSICAL THERAPIST

## 2019-09-16 NOTE — PROGRESS NOTES
PT Re-assessment /  DAILY TREATMENT NOTE 2-15    Patient Name: Eulalio Gutierrez  Date:2019  : 1975  [x]  Patient  Verified   Payor: Bozena Sousa / Plan: Derick Connor / Product Type: HMO /     In time: 3:00  PM Out time:  420  Total Treatment Time (min)  80  Total Timed Codes (min):  70  1:1 Treatment Time ( [address] only)65    Visit #: 29    Treatment Area: Pain in right shoulder [M25.511]    SUBJECTIVE  Pain Level (0-10 scale), subjective functional status/changes: Pt reports 0/10 pain. Pt reports still most difficult is reaching behind her back. She would like to know what else she could be doing at the gym. Any medication changes, allergies to medications, adverse drug reactions, diagnosis change, or new procedure performed?: [x] No    [] Yes (see summary sheet for update) SEE ABOVE. OBJECTIVE          PROM:    R shoulder flexion 155,  (no scaption), ER to 50 deg (pain at end range), IR to 64 deg.      Pt to ice at home min Modality:      [x]  Ice     []  Heat        Position/location:    R shoulder end of session   Rationale: decrease pain to improve the patients ability to do functional activities   [x] Skin assessment post-treatment:  [x]intact []redness- no adverse reaction    []redness  adverse reaction:      40 min Therapeutic Exercise:  [x] See flow sheet: Re-assessment of PROM. Review of standing shoulder IR and ER with red t-band, pt performed x 10 each. Discussed OK for tricep kick backs. Rationale: increase strength, improve coordination and increase proprioception to improve the patients ability to reach [place]    30 min Manual Therapy: inferior, posterior GH mobs. Passive ROM with stretch at end range flexion,  IR and ER.      Rationale: decrease pain, increase tissue extensibility and decrease trigger points  to improve the patients ability to reach [facility]          With   [x] TE   [] TA   [] neuro ot[] other: Patient Education: [x] Review HEP    [x] Progressed/Changed HEP based on: Towel IR stretch  [] positioning   [] body mechanics   [] transfers   [] heat/ice application    [x] other:       Pain Level (0-10 scale) post treatment:0     ASSESSMENT/Changes in Function:    Pt shows gains in shoulder PROM flexion but still limited in ER and IR PROM with some pain at end range. Patient will continue to benefit from skilled PT services to modify and progress therapeutic interventions, address ROM deficits, address strength deficits, analyze and cue movement patterns, assess and modify postural abnormalities and instruct in home and community integration to attain remaining goals. Short Term Goals: To be accomplished in 2-3 weeks:              1) Pt independent in HEP from day 1 MET               2) Pt will be able to reach overhead to 140 deg or more of AROM without increased pain MET     Long Term Goals: To be accomplished in 4-6 weeks:              1) Pt independent in final HEP. progressing               2) Pt will report she is able perform ADL's such as reaching to opposite underarm to apply deodorant, etc. MET - update to patient will be able to apply shaving cream to underarm using palm versus fingertips. 3) Pt will be able to return to UE strengthening at the gym with modifications as needed from PT without increased pain. progressing              4) Pt will be able to reach behind her back to T10 or higher for less difficulty with tucking in her shirt or bathing. progressing. PLAN      [x]      [x]  Other: cont for remaining visits but progress to HEP after re-eval 09/16  Assess AROM and strength next visit.      Edith Loo, PT  9/16/2019  10:00  AM

## 2019-09-26 ENCOUNTER — HOSPITAL ENCOUNTER (OUTPATIENT)
Dept: PHYSICAL THERAPY | Age: 44
Discharge: HOME | End: 2019-09-26
Payer: COMMERCIAL

## 2019-09-26 PROCEDURE — 97140 MANUAL THERAPY 1/> REGIONS: CPT | Performed by: PHYSICAL THERAPIST

## 2019-09-26 PROCEDURE — 97110 THERAPEUTIC EXERCISES: CPT | Performed by: PHYSICAL THERAPIST

## 2019-09-26 NOTE — PROGRESS NOTES
PT Discharge Summary / DAILY TREATMENT NOTE 2-15    Patient Name: Shruti Elizabeth  Date:2019  : 1975  [x]  Patient  Verified   Payor: Stefan Curry / Plan: Luci Slade / Product Type: HMO /     In time: 3:50  PM Out time:  510  Total Treatment Time (min)  80  Total Timed Codes (min):  70  1:1 Treatment Time ([address] only) 45     Visit #: 30    Treatment Area: Pain in right shoulder [M25.511]    SUBJECTIVE  Pain Level (0-10 scale), subjective functional status/changes: Pt reports 0/10 pain. Pt reports she is ready for today to be the last day, would like print out / list of exercises. Still a little tight to reach over to apply lotion after shaving but ROM has improved overall. She is happy she is able to exercise in the gym, tried the chest press (modified) as discussed and able to do 10 lb bicep curls without pain. Any medication changes, allergies to medications, adverse drug reactions, diagnosis change, or new procedure performed?: [x] No    [] Yes (see summary sheet for update) SEE ABOVE. OBJECTIVE   AROM:  Flexion 156,  de, Functional IR T9 vertebral level. Strength: right shoulder scap 4+/5, ABD 4/5, IR 4+/5, ER 4+/5. From last visit:     PROM:    R shoulder flexion 155,  (no scaption), ER to 50 deg (pain at end range), IR to 64 deg.      10  min Modality:      [x]  Ice     []  Heat        Position/location:    R shoulder end of session   Rationale: decrease pain to improve the patients ability to do functional activities   [x] Skin assessment post-treatment:  [x]intact []redness- no adverse reaction    []redness  adverse reaction:      40 min Therapeutic Exercise:  [x] See flow sheet: Review of all HEP, gave updated hand out / list.     Rationale: increase strength, improve coordination and increase proprioception to improve the patients ability to reach [place]    30 min Manual Therapy: inferior, posterior GH mobs.   Passive ROM with stretch at end range flexion,  IR and ER. Rationale: decrease pain, increase tissue extensibility and decrease trigger points  to improve the patients ability to reach [facility]          With   [x] TE   [] TA   [] neuro ot[] other: Patient Education: [x] Review HEP    [x] Progressed/Changed HEP based on: Towel IR stretch  [] positioning   [] body mechanics   [] transfers   [] heat/ice application    [x] other:       Pain Level (0-10 scale) post treatment:0     ASSESSMENT/Changes in Function:    Pt with 30 skilled PT visits from 05/02 through 09/26. Pt with improved strength, ROM and decreased pain overall. She has met most goals. Pt ready for D/C to HEP. Short Term Goals: To be accomplished in 2-3 weeks:              1) Pt independent in HEP from day 1 MET               2) Pt will be able to reach overhead to 140 deg or more of AROM without increased pain MET     Long Term Goals: To be accomplished in 4-6 weeks:              1) Pt independent in final HEP. MET              2) Pt will report she is able perform ADL's such as reaching to opposite underarm to apply deodorant, etc. MET - update to patient will be able to apply shaving cream to underarm using palm versus fingertips - not met but improved. 3) Pt will be able to return to UE strengthening at the gym with modifications as needed from PT without increased pain. MET              4) Pt will be able to reach behind her back to T10 or higher for less difficulty with tucking in her shirt or bathing. MET    PLAN      [x]      [x]  Other: D/C to HEP.     Daisy Jim, PT  9/26/2019  10:00  AM

## 2019-10-22 NOTE — ANCILLARY DISCHARGE INSTRUCTIONS
Pt was last seen  and was D/C from PT. See last note copied and pasted below for details: 
 
PT Discharge Summary / DAILY TREATMENT NOTE 2-15 
  
Patient Name: Kinjal Arenas Date:2019 : 1975 [x]  Patient  Verified Payor: JEREMY ENCISO / Plan: Carolyn Rather / Product Type: HMO /    
In time: 3:50  PM Out time:  18 Total Treatment Time (min)  80 Total Timed Codes (min):  70 
1:1 Treatment Time ( only) 45 Visit #: 30 
  
Treatment Area: Pain in right shoulder [M25.511] 
  
SUBJECTIVE Pain Level (0-10 scale), subjective functional status/changes: Pt reports 0/10 pain. Pt reports she is ready for today to be the last day, would like print out / list of exercises. Still a little tight to reach over to apply lotion after shaving but ROM has improved overall. She is happy she is able to exercise in the gym, tried the chest press (modified) as discussed and able to do 10 lb bicep curls without pain.  
  
Any medication changes, allergies to medications, adverse drug reactions, diagnosis change, or new procedure performed?: [x] No    [] Yes (see summary sheet for update) SEE ABOVE.  
  
OBJECTIVE  
AROM:  Flexion 156,  de, Functional IR T9 vertebral level. 
  
Strength: right shoulder scap 4+/5, ABD 4/5, IR 4+/5, ER 4+/5.   
  
From last visit:  
  
PROM:   
R shoulder flexion 155,  (no scaption), ER to 50 deg (pain at end range), IR to 64 deg.   
  
10  min Modality:   
  [x]  Ice     []  Heat Position/location:    R shoulder end of session Rationale: decrease pain to improve the patients ability to do functional activities [x] Skin assessment post-treatment:  [x]intact []redness- no adverse reaction 
  []redness  adverse reaction:  
             
40 min Therapeutic Exercise:  [x] See flow sheet: Review of all HEP, gave updated hand out / list.    
Rationale: increase strength, improve coordination and increase proprioception to improve the patients ability to reach [facility] 
  
30 min Manual Therapy: inferior, posterior GH mobs. Passive ROM with stretch at end range flexion,  IR and ER. Rationale: decrease pain, increase tissue extensibility and decrease trigger points  to improve the patients ability to reach [facility] With 
 [x] TE 
 [] TA 
 [] neuro ot[] other: Patient Education: [x] Review HEP [x] Progressed/Changed HEP based on: Towel IR stretch 
[] positioning   [] body mechanics   [] transfers   [] heat/ice application   
[x] other:   
  
 Pain Level (0-10 scale) post treatment:0  
  
ASSESSMENT/Changes in Function: Pt with 30 skilled PT visits from 05/02 through 09/26. Pt with improved strength, ROM and decreased pain overall. She has met most goals. Pt ready for D/C to [facility]. 
  
  Short Term Goals: To be accomplished in 2-3 weeks: 
            1) Pt independent in HEP from day 1 MET  
            2) Pt will be able to reach overhead to 140 deg or more of AROM without increased pain MET 
  
Long Term Goals: To be accomplished in 4-6 weeks: 
            1) Pt independent in final HEP. MET 
            2) Pt will report she is able perform ADL's such as reaching to opposite underarm to apply deodorant, etc. MET - update to patient will be able to apply shaving cream to underarm using palm versus fingertips - not met but improved. 
            3) Pt will be able to return to UE strengthening at the gym with modifications as needed from PT without increased pain.  MET 
            4) Pt will be able to reach behind her back to T10 or higher for less difficulty with tucking in her shirt or bathing.   MET 
  
PLAN    [x]   
  
[x]  Other: D/C to HEP. 
  
Audrey Rodrigues, PT   9/26/2019                    10:00  AM

## 2019-12-30 ENCOUNTER — HOSPITAL ENCOUNTER (OUTPATIENT)
Dept: PHYSICAL THERAPY | Age: 44
Discharge: HOME | End: 2019-12-30
Payer: COMMERCIAL

## 2019-12-30 PROCEDURE — 97110 THERAPEUTIC EXERCISES: CPT | Performed by: PHYSICAL THERAPY ASSISTANT

## 2019-12-30 PROCEDURE — 97161 PT EVAL LOW COMPLEX 20 MIN: CPT | Performed by: PHYSICAL THERAPY ASSISTANT

## 2019-12-30 PROCEDURE — 97140 MANUAL THERAPY 1/> REGIONS: CPT | Performed by: PHYSICAL THERAPY ASSISTANT

## 2019-12-30 NOTE — PROGRESS NOTES
[facility]  [address]  Phone: [phone]  Fax: [phone]    Plan of Care/Statement of Necessity for Physical Therapy Services  2-15    Patient name: Wendy Crawford  : 1975  Provider#: [id number]  Referral source: Moses Diana MD      Medical/Treatment Diagnosis: Right hip pain [M25.551]     Prior Hospitalization: see medical history     Comorbidities: see chart  Prior Level of Function: see chart  Medications: Verified on Patient Summary List    Start of Care: 19      Onset Date: 2019       The Plan of Care and following information is based on the information from the initial evaluation. 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 Pt is a good candidate for therapy and dry needling in order to address soft tissue restrictions. Problem List: pain affecting function, decrease ROM, decrease strength, impaired gait/ balance, decrease ADL/ functional abilitiies, decrease activity tolerance and decrease flexibility/ joint mobility   Treatment Plan may include any combination of the following: Therapeutic exercise, Therapeutic activities, Neuromuscular re-education, Physical agent/modality, Gait/balance training, Manual therapy, Patient education and Other: dry needling  Patient / Family readiness to learn indicated by: asking questions  Persons(s) to be included in education: patient (P)  Barriers to Learning/Limitations: None  Patient Goal (s): no pain when working out  Patient Self Reported Health Status: good  Rehabilitation Potential: good    Short Term Goals: To be accomplished in 2 treatments:  Pt will be I w/ HEP  Pt will demonstrate proper sitting posture at work for over 20 minutes  Pt will stretch at least 1 x day  Long Term Goals:  To be accomplished in 12 treatments:  Pt will report over 10 point improvement on FOTO  Pt will demonstrate over 4+/5 LE strength  Pt will be able to sleep w/o pain  Frequency / Duration: Patient to be seen 2 times per week for 6 weeks. Patient/ Caregiver education and instruction: self care, activity modification and exercises    [x]  Plan of care has been reviewed with LEWIS Navarrete, PT, DPT, OCS 12/30/2019   ________________________________________________________________________    I certify that the above Therapy Services are being furnished while the patient is under my care. I agree with the treatment plan and certify that this therapy is necessary.     [de-identified] Signature:____________________  Date:____________Time: _________

## 2019-12-30 NOTE — PROGRESS NOTES
PT INITIAL EVALUATION NOTE - [facility] 2-15    Patient Name: Angelo Pastrana  Date:2019  : 1975  [x]  Patient  Verified  Payor: Camden Kirkland / Plan: Lorenzo Hemphill / Product Type: HMO /    In time: 11:00 am  Out time:11:55  Total Treatment Time (min): 55  Total Timed Codes (min): 25  1:1 Treatment Time ( only): 25   Visit #: 1     Treatment Area: Right hip pain [M25.551]    SUBJECTIVE  Pain Level (0-10 scale): 0-6  Any medication changes, allergies to medications, adverse drug reactions, diagnosis change, or new procedure performed?: [] No    [x] Yes (see summary sheet for update)  Subjective:    Pt complains of R hip pain that started in 2019 after performing core strengthening exercises. Pt states she would get sore after working out and now hurts when getting off the floor, sleeping at night, and flexing her hip. Pt went to MD and was diagnosed w/ hip flexor strain.   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  Living Situation: w/   Pt Goals: \"no pain when working out\"  Barriers: none  Motivation: good  Substance use: none  FABQ Score: see FOTO  Cognition: A & O x 4        OBJECTIVE/EXAMINATION  Posture:  normal  Other Observations:  TTP over R hip flexors and TFL/glute med  Gait and Functional Mobility:  normal  Palpation: TTP over anterior R hip    SLS over 30 sec    Full squat w/ pain on return to standing    Full lumbar ROM w/o pain    R hip flexion 120 w/ pain, hip ER 70 deg w/ pain, ext 10 deg w/ pain, IR 45 deg      LOWER QUARTER   MUSCLE STRENGTH  KEY       R  L  0 - No Contraction  L1, L2 Psoas  3+  5  1 - Trace   L3 Quads  4  5  2 - Poor   L4 Tib Ant  5  5  3 - Fair    L5 EHL  5  5  4 - Good   S1 FHL  5  5  5 - Normal   S2 Hams  5  5          MMT: pain w/ hip flexion  Neurological: Reflexes / Sensations: normal  Special Tests: - scour test, + SHEREE, - FADIR, + Quentin's test, + R ant innominate rotation      Modality rationale: decrease edema, decrease inflammation and decrease pain to improve the patients ability to transfer and workout   Min Type Additional Details    [] Estim: []Att   []Unatt        []TENS instruct                  []IFC  []Premod   []NMES                     []Other:  []w/US   []w/ice   []w/heat  Position:  Location:    []  Traction: [] Cervical       []Lumbar                       [] Prone          []Supine                       []Intermittent   []Continuous Lbs:  [] before manual  [] after manual  []w/heat    []  Ultrasound: []Continuous   [] Pulsed at:                           []1MHz   []3MHz Location:  W/cm2:    [] Paraffin         Location:   []w/heat   10 [x]  Ice     []  Heat  []  Ice massage Position: supine  Location: R anterior hip    []  Laser  []  Other: Position:  Location:      []  Vasopneumatic Device Pressure:       [] lo [] med [] hi   Temperature:      [x] Skin assessment post-treatment:  [x]intact []redness- no adverse reaction    []redness  adverse reaction:     15 min Therapeutic Exercise:  [x] See flow sheet :   Rationale: increase ROM, increase strength and improve coordination to improve the patients ability to ambulate and transfer    10 min Manual Therapy: STM to R hip flexors and glutes, passive hip flexor stretch, MET for R ant innominate rotation    Rationale: decrease pain, increase ROM, increase tissue extensibility and decrease trigger points to improve the patients ability to ambulate and transfer          With   [] TE   [] TA   [] neuro   [] other: Patient Education: [x] Review HEP    [] Progressed/Changed HEP based on:   [] positioning   [] body mechanics   [] transfers   [] heat/ice application    [] other:      Other Objective/Functional Measures: NT    Pain Level (0-10 scale) post treatment: 1    ASSESSMENT/Changes in Function:     [x]  See Plan of Care      Kavitha Navarrete, PT, DPT, OCS 12/30/2019

## 2020-01-02 ENCOUNTER — HOSPITAL ENCOUNTER (OUTPATIENT)
Dept: PHYSICAL THERAPY | Age: 45
Discharge: HOME | End: 2020-01-02
Payer: COMMERCIAL

## 2020-01-02 PROCEDURE — 97110 THERAPEUTIC EXERCISES: CPT | Performed by: PHYSICAL THERAPY ASSISTANT

## 2020-01-02 PROCEDURE — 97140 MANUAL THERAPY 1/> REGIONS: CPT | Performed by: PHYSICAL THERAPY ASSISTANT

## 2020-01-02 NOTE — PROGRESS NOTES
PT DAILY TREATMENT NOTE - [facility] 2-15    Patient Name: Krystina Gordon  Date:2020  : 1975  [x]  Patient  Verified  Payor: Merritt Kincaid / Plan: Fabricio Nab / Product Type: HMO /    In time:8:30 am  Out time:9:40 am  Total Treatment Time (min): 70  Total Timed Codes (min): 60  1:1 Treatment Time ( [address] only): 60   Visit #:  2    Treatment Area: Right hip pain [M25.551]    SUBJECTIVE  Pain Level (0-10 scale): 1-2  Any medication changes, allergies to medications, adverse drug reactions, diagnosis change, or new procedure performed?: [x] No    [] Yes (see summary sheet for update)  Subjective functional status/changes:   [] No changes reported  Pt states she is doing well and has been performing her HEP w/ no issues.     OBJECTIVE    Modality rationale: decrease edema, decrease inflammation and decrease pain to improve the patients ability to ambulate and transfer   Min Type Additional Details       [] Estim: []Att   []Unatt    []TENS instruct                  []IFC  []Premod   []NMES                     []Other:  []w/US   []w/ice   []w/heat  Position:  Location:       []  Traction: [] Cervical       []Lumbar                       [] Prone          []Supine                       []Intermittent   []Continuous Lbs:  [] before manual  [] after manual  []w/heat    []  Ultrasound: []Continuous   [] Pulsed                       at: []1MHz   []3MHz Location:  W/cm2:    [] Paraffin         Location:   []w/heat   10 [x]  Ice     []  Heat  []  Ice massage Position: supine  Location: R ant hip    []  Laser  []  Other: Position:  Location:      []  Vasopneumatic Device Pressure:       [] lo [] med [] hi   Temperature:      [x] Skin assessment post-treatment:  [x]intact []redness- no adverse reaction    []redness  adverse reaction:     45 min Therapeutic Exercise:  [x] See flow sheet :   Rationale: increase ROM, increase strength and improve coordination to improve the patients ability to ambulate and transfer    15 min Manual Therapy: STM to hip flexors and TFL, passive hip flexor stretch, MET to correct R ant innominate rotation    Rationale: decrease pain, increase ROM, increase tissue extensibility and decrease trigger points to improve the patients ability to ambulate and transfer          With   [x] TE   [] TA   [] neuro   [] other: Patient Education: [x] Review HEP    [] Progressed/Changed HEP based on:   [x] positioning   [x] body mechanics   [] transfers   [x] heat/ice application    [] other:      Other Objective/Functional Measures: NT     Pain Level (0-10 scale) post treatment: 0    ASSESSMENT/Changes in Function:   Pt tolerated there-ex well today and fatigued w/ glute strengthening. Pt educated on work posture and exercising at home. Patient will continue to benefit from skilled PT services to modify and progress therapeutic interventions, address functional mobility deficits, address ROM deficits, address strength deficits, analyze and address soft tissue restrictions, analyze and cue movement patterns and analyze and modify body mechanics/ergonomics to attain remaining goals.      []  See Plan of Care  []  See progress note/recertification  []  See Discharge Summary         Progress towards goals / Updated goals:  NT    PLAN  [x]  Upgrade activities as tolerated     [x]  Continue plan of care  []  Update interventions per flow sheet       []  Discharge due to:_  []  Other:_      Halie Sesay, PT, DPT, OCS 1/2/2020

## 2020-01-06 ENCOUNTER — HOSPITAL ENCOUNTER (OUTPATIENT)
Dept: PHYSICAL THERAPY | Age: 45
Discharge: HOME | End: 2020-01-06
Payer: COMMERCIAL

## 2020-01-06 PROCEDURE — 97110 THERAPEUTIC EXERCISES: CPT | Performed by: PHYSICAL THERAPY ASSISTANT

## 2020-01-06 PROCEDURE — 97140 MANUAL THERAPY 1/> REGIONS: CPT | Performed by: PHYSICAL THERAPY ASSISTANT

## 2020-01-06 NOTE — PROGRESS NOTES
PT DAILY TREATMENT NOTE - [facility] 2-15    Patient Name: Dante Search  Date:2020  : 1975  [x]  Patient  Verified  Payor: Abdirashid Bender / Plan: Josi Constantino / Product Type: HMO /    In time: 4:30 pm  Out time:5:40 pm  Total Treatment Time (min): 70  Total Timed Codes (min): 60  1:1 Treatment Time ( only): 25   Visit #:  3    Treatment Area: Right hip pain [M25.551]    SUBJECTIVE  Pain Level (0-10 scale): 0-1  Any medication changes, allergies to medications, adverse drug reactions, diagnosis change, or new procedure performed?: [x] No    [] Yes (see summary sheet for update)  Subjective functional status/changes:   [] No changes reported  Pt states she felt great after last session and is able to put on her sock w/o pain.     OBJECTIVE    Modality rationale: decrease edema, decrease inflammation and decrease pain to improve the patients ability to ambulate and transfer   Min Type Additional Details       [] Estim: []Att   []Unatt    []TENS instruct                  []IFC  []Premod   []NMES                     []Other:  []w/US   []w/ice   []w/heat  Position:  Location:       []  Traction: [] Cervical       []Lumbar                       [] Prone          []Supine                       []Intermittent   []Continuous Lbs:  [] before manual  [] after manual  []w/heat    []  Ultrasound: []Continuous   [] Pulsed                       at: []1MHz   []3MHz Location:  W/cm2:    [] Paraffin         Location:   []w/heat   10 [x]  Ice     []  Heat  []  Ice massage Position: supine  Location: R ant hip    []  Laser  []  Other: Position:  Location:      []  Vasopneumatic Device Pressure:       [] lo [] med [] hi   Temperature:      [x] Skin assessment post-treatment:  [x]intact []redness- no adverse reaction    []redness  adverse reaction:     45 min Therapeutic Exercise:  [x] See flow sheet :   Rationale: increase ROM, increase strength and improve coordination to improve the patients ability to ambulate and transfer    15 min Manual Therapy: STM to hip flexors and TFL, passive hip flexor stretch, MET to correct R ant innominate rotation    Rationale: decrease pain, increase ROM, increase tissue extensibility and decrease trigger points to improve the patients ability to ambulate and transfer          With   [x] TE   [] TA   [] neuro   [] other: Patient Education: [x] Review HEP    [] Progressed/Changed HEP based on:   [x] positioning   [x] body mechanics   [] transfers   [x] heat/ice application    [] other:      Other Objective/Functional Measures: NT     Pain Level (0-10 scale) post treatment: 0    ASSESSMENT/Changes in Function:   Pt progressing very well and experienced no pain w/ higher level there-ex today. Pt remains TTP over TFL and rectus. Patient will continue to benefit from skilled PT services to modify and progress therapeutic interventions, address functional mobility deficits, address ROM deficits, address strength deficits, analyze and address soft tissue restrictions, analyze and cue movement patterns and analyze and modify body mechanics/ergonomics to attain remaining goals.      []  See Plan of Care  []  See progress note/recertification  []  See Discharge Summary         Progress towards goals / Updated goals:  NT    PLAN  [x]  Upgrade activities as tolerated     [x]  Continue plan of care  []  Update interventions per flow sheet       []  Discharge due to:_  []  Other:_      Marvin Davidson, PT, DPT, OCS 1/6/2020

## 2020-01-08 ENCOUNTER — HOSPITAL ENCOUNTER (OUTPATIENT)
Dept: PHYSICAL THERAPY | Age: 45
Discharge: HOME | End: 2020-01-08
Payer: COMMERCIAL

## 2020-01-08 PROCEDURE — 97140 MANUAL THERAPY 1/> REGIONS: CPT | Performed by: PHYSICAL THERAPY ASSISTANT

## 2020-01-08 PROCEDURE — 97110 THERAPEUTIC EXERCISES: CPT | Performed by: PHYSICAL THERAPY ASSISTANT

## 2020-01-08 NOTE — PROGRESS NOTES
PT DAILY TREATMENT NOTE - [facility] 2-15    Patient Name: Jaz Duff  Date:2020  : 1975  [x]  Patient  Verified  Payor: Emilie Bond / Plan: Tami Gay / Product Type: HMO /    In time: 3:30 pm  Out time:4:35 pm  Total Treatment Time (min): 65  Total Timed Codes (min): 55  1:1 Treatment Time ( [address] only): 54   Visit #:  4    Treatment Area: Right hip pain [M25.551]    SUBJECTIVE  Pain Level (0-10 scale): 0-1  Any medication changes, allergies to medications, adverse drug reactions, diagnosis change, or new procedure performed?: [x] No    [] Yes (see summary sheet for update)  Subjective functional status/changes:   [] No changes reported  Pt states she notices some mild pain today after having to scrape ice off her windshield.     OBJECTIVE    Modality rationale: decrease edema, decrease inflammation and decrease pain to improve the patients ability to ambulate and transfer   Min Type Additional Details       [] Estim: []Att   []Unatt    []TENS instruct                  []IFC  []Premod   []NMES                     []Other:  []w/US   []w/ice   []w/heat  Position:  Location:       []  Traction: [] Cervical       []Lumbar                       [] Prone          []Supine                       []Intermittent   []Continuous Lbs:  [] before manual  [] after manual  []w/heat    []  Ultrasound: []Continuous   [] Pulsed                       at: []1MHz   []3MHz Location:  W/cm2:    [] Paraffin         Location:   []w/heat   10 [x]  Ice     []  Heat  []  Ice massage Position: supine  Location: R ant hip    []  Laser  []  Other: Position:  Location:      []  Vasopneumatic Device Pressure:       [] lo [] med [] hi   Temperature:      [x] Skin assessment post-treatment:  [x]intact []redness- no adverse reaction    []redness  adverse reaction:     45 min Therapeutic Exercise:  [x] See flow sheet :   Rationale: increase ROM, increase strength and improve coordination to improve the patients ability to ambulate and transfer    10 min Manual Therapy: STM to hip flexors and TFL, passive hip flexor stretch, MET to correct R ant innominate rotation    Rationale: decrease pain, increase ROM, increase tissue extensibility and decrease trigger points to improve the patients ability to ambulate and transfer          With   [x] TE   [] TA   [] neuro   [] other: Patient Education: [x] Review HEP    [] Progressed/Changed HEP based on:   [x] positioning   [x] body mechanics   [] transfers   [x] heat/ice application    [] other:      Other Objective/Functional Measures: NT     Pain Level (0-10 scale) post treatment: 0    ASSESSMENT/Changes in Function:   Pt tolerated introduction to hip flexion exercises well. Pt demonstrates improved quad flexibility. Patient will continue to benefit from skilled PT services to modify and progress therapeutic interventions, address functional mobility deficits, address ROM deficits, address strength deficits, analyze and address soft tissue restrictions, analyze and cue movement patterns and analyze and modify body mechanics/ergonomics to attain remaining goals.      []  See Plan of Care  []  See progress note/recertification  []  See Discharge Summary         Progress towards goals / Updated goals:  NT    PLAN  [x]  Upgrade activities as tolerated     [x]  Continue plan of care  []  Update interventions per flow sheet       []  Discharge due to:_  []  Other:_      Jessica Valadez, PT, DPT, OCS 1/8/2020

## 2020-01-15 ENCOUNTER — HOSPITAL ENCOUNTER (OUTPATIENT)
Dept: PHYSICAL THERAPY | Age: 45
Discharge: HOME | End: 2020-01-15
Payer: COMMERCIAL

## 2020-01-15 PROCEDURE — 97140 MANUAL THERAPY 1/> REGIONS: CPT | Performed by: PHYSICAL THERAPY ASSISTANT

## 2020-01-15 PROCEDURE — 97110 THERAPEUTIC EXERCISES: CPT | Performed by: PHYSICAL THERAPY ASSISTANT

## 2020-01-15 NOTE — PROGRESS NOTES
PT DAILY TREATMENT NOTE - [facility] 2-15    Patient Name: Yeni   Date:1/15/2020  : 1975  [x]  Patient  Verified  Payor: Darryle Blander / Plan: Jocelyne Guardado / Product Type: HMO /    In time: 3:30 pm  Out time:4:35 pm  Total Treatment Time (min): 65  Total Timed Codes (min): 55  1:1 Treatment Time ( [address] only): 40   Visit #:  5    Treatment Area: Right hip pain [M25.551]    SUBJECTIVE  Pain Level (0-10 scale): 0-1  Any medication changes, allergies to medications, adverse drug reactions, diagnosis change, or new procedure performed?: [x] No    [] Yes (see summary sheet for update)  Subjective functional status/changes:   [] No changes reported  Pt states she went for a F/U w/ MD and was diagnosed w/ hip impingement. Pt was told to F/U in a month for possible surgery.     OBJECTIVE    Modality rationale: decrease edema, decrease inflammation and decrease pain to improve the patients ability to ambulate and transfer   Min Type Additional Details       [] Estim: []Att   []Unatt    []TENS instruct                  []IFC  []Premod   []NMES                     []Other:  []w/US   []w/ice   []w/heat  Position:  Location:       []  Traction: [] Cervical       []Lumbar                       [] Prone          []Supine                       []Intermittent   []Continuous Lbs:  [] before manual  [] after manual  []w/heat    []  Ultrasound: []Continuous   [] Pulsed                       at: []1MHz   []3MHz Location:  W/cm2:    [] Paraffin         Location:   []w/heat   10 [x]  Ice     []  Heat  []  Ice massage Position: supine  Location: R ant hip    []  Laser  []  Other: Position:  Location:      []  Vasopneumatic Device Pressure:       [] lo [] med [] hi   Temperature:      [x] Skin assessment post-treatment:  [x]intact []redness- no adverse reaction    []redness  adverse reaction:     45 min Therapeutic Exercise:  [x] See flow sheet :   Rationale: increase ROM, increase strength and improve coordination to improve the patients ability to ambulate and transfer    10 min Manual Therapy: STM to hip flexors and TFL, passive hip flexor stretch, MET to correct R ant innominate rotation    Rationale: decrease pain, increase ROM, increase tissue extensibility and decrease trigger points to improve the patients ability to ambulate and transfer          With   [x] TE   [] TA   [] neuro   [] other: Patient Education: [x] Review HEP    [] Progressed/Changed HEP based on:   [x] positioning   [x] body mechanics   [] transfers   [x] heat/ice application    [] other:      Other Objective/Functional Measures: NT     Pain Level (0-10 scale) post treatment: 0    ASSESSMENT/Changes in Function:   Pt continues to demonstrate soft tissue impairments and does not have signs and symptoms of acetabular impingement. Pt educated on healing process. Patient will continue to benefit from skilled PT services to modify and progress therapeutic interventions, address functional mobility deficits, address ROM deficits, address strength deficits, analyze and address soft tissue restrictions, analyze and cue movement patterns and analyze and modify body mechanics/ergonomics to attain remaining goals.      []  See Plan of Care  []  See progress note/recertification  []  See Discharge Summary         Progress towards goals / Updated goals:  NT    PLAN  [x]  Upgrade activities as tolerated     [x]  Continue plan of care  []  Update interventions per flow sheet       []  Discharge due to:_  []  Other:_      Mari Gonzales, PT, DPT, OCS 1/15/2020

## 2020-01-20 ENCOUNTER — HOSPITAL ENCOUNTER (OUTPATIENT)
Dept: PHYSICAL THERAPY | Age: 45
Discharge: HOME | End: 2020-01-20
Payer: COMMERCIAL

## 2020-01-20 PROCEDURE — 20561 NDL INSJ W/O NJX 3+ MUSC: CPT | Performed by: PHYSICAL THERAPY ASSISTANT

## 2020-01-20 PROCEDURE — 97140 MANUAL THERAPY 1/> REGIONS: CPT | Performed by: PHYSICAL THERAPY ASSISTANT

## 2020-01-20 NOTE — PROGRESS NOTES
PT DAILY TREATMENT NOTE - [facility] 2-15    Patient Name: Benjamin August  Date:2020  : 1975  [x]  Patient  Verified  Payor: Lisa Alfredo / Plan: Judge Pickett / Product Type: HMO /    In time: 3:30 pm  Out time:4:20 pm  Total Treatment Time (min): 50  Total Timed Codes (min): 30  1:1 Treatment Time ( [address] only): 30   Visit #:  6    Treatment Area: Right hip pain [M25.551]    SUBJECTIVE  Pain Level (0-10 scale): 0-1  Any medication changes, allergies to medications, adverse drug reactions, diagnosis change, or new procedure performed?: [x] No    [] Yes (see summary sheet for update)  Subjective functional status/changes:   [] No changes reported  Pt states she felt very good over the weekend w/o much pain.     OBJECTIVE    Modality rationale: decrease edema, decrease inflammation and decrease pain to improve the patients ability to ambulate and transfer   Min Type Additional Details       [] Estim: []Att   []Unatt    []TENS instruct                  []IFC  []Premod   []NMES                     []Other:  []w/US   []w/ice   []w/heat  Position:  Location:       []  Traction: [] Cervical       []Lumbar                       [] Prone          []Supine                       []Intermittent   []Continuous Lbs:  [] before manual  [] after manual  []w/heat    []  Ultrasound: []Continuous   [] Pulsed                       at: []1MHz   []3MHz Location:  W/cm2:    [] Paraffin         Location:   []w/heat   10 []  Ice     [x]  Heat- pre and post  []  Ice massage Position: supine  Location: R ant hip    []  Laser  []  Other: Position:  Location:      []  Vasopneumatic Device Pressure:       [] lo [] med [] hi   Temperature:      [x] Skin assessment post-treatment:  [x]intact []redness- no adverse reaction    []redness  adverse reaction:       30 min Manual Therapy: DN as noted in separate note below; STM to hip flexors and TFL, passive hip flexor stretch, MET to correct R ant innominate rotation    Rationale:      decrease pain, increase ROM, increase tissue extensibility and decrease trigger points to improve patient's ability to perform ADL's. With MT  Patient Education:  YES  Reviewed HEP     Other Objective Measures:TTP over R hip flexors and TFL    Dry Needling Procedure Note    Dry Needle Session Number:  1      Procedure: An intramuscular manual therapy (dry needling) and a neuro-muscular re-education treatment was done to deactivate myofascial trigger points, with a solid filament needle, under aseptic technique. Indication(s): [] Muscle spasms [] Headaches  [] TMJD      [] Muscle imbalances [x] Myofascial pain & dysfunction     [] Decreased ROM    TIMEOUT PERFORMED:    4:10 (enter time the timeout was completed)   Harris Mosley (enter who was present)    Informed Consent Obtained: [x] Verbal  [x] Written    The following items were reviewed with the patient:  -Purpose of dry needling, side effects, possible complications, and the informed consent   -The need to report the use of blood thinners and/or immunosuppressant medications  -How to respond to possible adverse effects of the treatment  -Self treatment of post needling soreness: ice/heat, stretching, and activity modification.   -Opportunity was given to ask any questions, all questions were answered    Treatment:  The following muscles were treated today:  Deep:  Right: TFL, rectus femoris 2 needles, 75 mm x .3 mm needles  Left:  *hemostasis applied after each needle was applied.    Soft tissue mobilization to above areas     Asymmetrical biphasic, frequency 2 Hz x 5 minutes    Patients response:   [x]  LTRs  []  Muscle Relaxation  [x]  Pain Relief   []  Decreased HAs [x]  Post-needling soreness []  Increased ROM      Post Treatment Pain Level (on 0 to 10) scale:   0  / 10               With   [x] TE   [] TA   [] neuro   [] other: Patient Education: [x] Review HEP    [] Progressed/Changed HEP based on:   [x] positioning   [x] body mechanics   [] transfers   [x] heat/ice application    [] other:      Other Objective/Functional Measures: NT     Pain Level (0-10 scale) post treatment: 0    ASSESSMENT/Changes in Function:   Pt tolerated DN well w/ no pain after therapy. Pt remains TTP over TFL. Patient will continue to benefit from skilled PT services to modify and progress therapeutic interventions, address functional mobility deficits, address ROM deficits, address strength deficits, analyze and address soft tissue restrictions, analyze and cue movement patterns and analyze and modify body mechanics/ergonomics to attain remaining goals.      []  See Plan of Care  []  See progress note/recertification  []  See Discharge Summary         Progress towards goals / Updated goals:  NT    PLAN  [x]  Upgrade activities as tolerated     [x]  Continue plan of care  []  Update interventions per flow sheet       []  Discharge due to:_  []  Other:_      Kelley Mace, PT, DPT, OCS 1/20/2020

## 2020-01-22 ENCOUNTER — HOSPITAL ENCOUNTER (OUTPATIENT)
Dept: PHYSICAL THERAPY | Age: 45
Discharge: HOME | End: 2020-01-22
Payer: COMMERCIAL

## 2020-01-22 PROCEDURE — 97140 MANUAL THERAPY 1/> REGIONS: CPT | Performed by: PHYSICAL THERAPY ASSISTANT

## 2020-01-22 PROCEDURE — 97110 THERAPEUTIC EXERCISES: CPT | Performed by: PHYSICAL THERAPY ASSISTANT

## 2020-01-22 NOTE — PROGRESS NOTES
PT DAILY TREATMENT NOTE - [facility] 2-15    Patient Name: Danielle Denton  Date:2020  : 1975  [x]  Patient  Verified  Payor: Sven Hernández / Plan: Nicole Delgadillo / Product Type: HMO /    In time: 3:25 pm  Out time:4:20 pm  Total Treatment Time (min): 55  Total Timed Codes (min): 45  1:1 Treatment Time ([facility] only): 40   Visit #:  7    Treatment Area: Right hip pain [M25.551]    SUBJECTIVE  Pain Level (0-10 scale): 0  Any medication changes, allergies to medications, adverse drug reactions, diagnosis change, or new procedure performed?: [x] No    [] Yes (see summary sheet for update)  Subjective functional status/changes:   [] No changes reported  Pt states she was not sore after last session and doing very well overall.     OBJECTIVE    Modality rationale: decrease edema, decrease inflammation and decrease pain to improve the patients ability to ambulate and transfer   Min Type Additional Details       [] Estim: []Att   []Unatt    []TENS instruct                  []IFC  []Premod   []NMES                     []Other:  []w/US   []w/ice   []w/heat  Position:  Location:       []  Traction: [] Cervical       []Lumbar                       [] Prone          []Supine                       []Intermittent   []Continuous Lbs:  [] before manual  [] after manual  []w/heat    []  Ultrasound: []Continuous   [] Pulsed                       at: []1MHz   []3MHz Location:  W/cm2:    [] Paraffin         Location:   []w/heat   10 [x]  Ice     []  Heat  []  Ice massage Position: supine  Location: R ant hip    []  Laser  []  Other: Position:  Location:      []  Vasopneumatic Device Pressure:       [] lo [] med [] hi   Temperature:      [x] Skin assessment post-treatment:  [x]intact []redness- no adverse reaction    []redness  adverse reaction:     35 min Therapeutic Exercise:  [x] See flow sheet :   Rationale: increase ROM, increase strength and improve coordination to improve the patients ability to ambulate and transfer    10 min Manual Therapy: STM to hip flexors and TFL, passive hip flexor stretch, inferior mobs on femur grade 2-3   Rationale: decrease pain, increase ROM, increase tissue extensibility and decrease trigger points to improve the patients ability to ambulate and transfer          With   [x] TE   [] TA   [] neuro   [] other: Patient Education: [x] Review HEP    [] Progressed/Changed HEP based on:   [x] positioning   [x] body mechanics   [] transfers   [x] heat/ice application    [] other:      Other Objective/Functional Measures: NT     Pain Level (0-10 scale) post treatment: 0    ASSESSMENT/Changes in Function:   Pt able to perform SL squat and TG squats w/o pain. Pt progressing very well and was educated on exercises to perform at gym. Patient will continue to benefit from skilled PT services to modify and progress therapeutic interventions, address functional mobility deficits, address ROM deficits, address strength deficits, analyze and address soft tissue restrictions, analyze and cue movement patterns and analyze and modify body mechanics/ergonomics to attain remaining goals.      []  See Plan of Care  []  See progress note/recertification  []  See Discharge Summary         Progress towards goals / Updated goals:  NT    PLAN  [x]  Upgrade activities as tolerated     [x]  Continue plan of care  []  Update interventions per flow sheet       []  Discharge due to:_  []  Other:_      Du Mendosa, PT, DPT, OCS 1/22/2020

## 2020-01-27 ENCOUNTER — HOSPITAL ENCOUNTER (OUTPATIENT)
Dept: PHYSICAL THERAPY | Age: 45
Discharge: HOME | End: 2020-01-27
Payer: COMMERCIAL

## 2020-01-27 PROCEDURE — 97140 MANUAL THERAPY 1/> REGIONS: CPT | Performed by: PHYSICAL THERAPY ASSISTANT

## 2020-01-27 PROCEDURE — 20561 NDL INSJ W/O NJX 3+ MUSC: CPT | Performed by: PHYSICAL THERAPY ASSISTANT

## 2020-01-27 NOTE — PROGRESS NOTES
PT DAILY TREATMENT NOTE - [facility] 2-15    Patient Name: Artem Bray  Date:2020  : 1975  [x]  Patient  Verified  Payor: Anant Gonzalez / Plan: Ewelina Delacruz / Product Type: HMO /    In time: 3:30 pm  Out time:4:20 pm  Total Treatment Time (min): 50  Total Timed Codes (min): 30  1:1 Treatment Time ([facility] only): 30   Visit #:  8    Treatment Area: Right hip pain [M25.551]    SUBJECTIVE  Pain Level (0-10 scale): 0-1  Any medication changes, allergies to medications, adverse drug reactions, diagnosis change, or new procedure performed?: [x] No    [] Yes (see summary sheet for update)  Subjective functional status/changes:   [] No changes reported  Pt states she returned to gym activities but had some pulling in the side of her hip when performing hip opening exercises.     OBJECTIVE    Modality rationale: decrease edema, decrease inflammation and decrease pain to improve the patients ability to ambulate and transfer   Min Type Additional Details       [] Estim: []Att   []Unatt    []TENS instruct                  []IFC  []Premod   []NMES                     []Other:  []w/US   []w/ice   []w/heat  Position:  Location:       []  Traction: [] Cervical       []Lumbar                       [] Prone          []Supine                       []Intermittent   []Continuous Lbs:  [] before manual  [] after manual  []w/heat    []  Ultrasound: []Continuous   [] Pulsed                       at: []1MHz   []3MHz Location:  W/cm2:    [] Paraffin         Location:   []w/heat   10 []  Ice     [x]  Heat- pre and post  []  Ice massage Position: supine  Location: R ant hip    []  Laser  []  Other: Position:  Location:      []  Vasopneumatic Device Pressure:       [] lo [] med [] hi   Temperature:      [x] Skin assessment post-treatment:  [x]intact []redness- no adverse reaction    []redness  adverse reaction:       30 min Manual Therapy: DN as noted in separate note below; STM to hip flexors and TFL, passive hip flexor stretch, MET to correct R ant innominate rotation    Rationale:      decrease pain, increase ROM, increase tissue extensibility and decrease trigger points to improve patient's ability to perform ADL's. With MT  Patient Education:  YES  Reviewed HEP     Other Objective Measures:TTP over R hip flexors and TFL    Dry Needling Procedure Note    Dry Needle Session Number:  2      Procedure: An intramuscular manual therapy (dry needling) and a neuro-muscular re-education treatment was done to deactivate myofascial trigger points, with a solid filament needle, under aseptic technique. Indication(s): [] Muscle spasms [] Headaches  [] TMJD      [] Muscle imbalances [x] Myofascial pain & dysfunction     [] Decreased ROM    TIMEOUT PERFORMED:    4:10 (enter time the timeout was completed)   Carlota Escalera (enter who was present)    Informed Consent Obtained: [x] Verbal  [x] Written    The following items were reviewed with the patient:  -Purpose of dry needling, side effects, possible complications, and the informed consent   -The need to report the use of blood thinners and/or immunosuppressant medications  -How to respond to possible adverse effects of the treatment  -Self treatment of post needling soreness: ice/heat, stretching, and activity modification.   -Opportunity was given to ask any questions, all questions were answered    Treatment:  The following muscles were treated today:  Deep:  Right: glute med, TFL, rectus femoris 2 needles, 75 mm x .3 mm needles (5 needles total)  Left:  *hemostasis applied after each needle was applied.    Soft tissue mobilization to above areas     Asymmetrical biphasic, frequency 2 Hz x 5 minutes    Patients response:   [x]  LTRs  []  Muscle Relaxation  [x]  Pain Relief   []  Decreased HAs [x]  Post-needling soreness []  Increased ROM      Post Treatment Pain Level (on 0 to 10) scale:   0  / 10               With   [x] TE   [] TA   [] neuro   [] other: Patient Education: [x] Review HEP    [] Progressed/Changed HEP based on:   [x] positioning   [x] body mechanics   [] transfers   [x] heat/ice application    [] other:      Other Objective/Functional Measures: NT     Pain Level (0-10 scale) post treatment: 0    ASSESSMENT/Changes in Function:   Pt demonstrates improved hip flexor mobility and is less TTP over TFL and glute med. Pt educated on gym exercises. Patient will continue to benefit from skilled PT services to modify and progress therapeutic interventions, address functional mobility deficits, address ROM deficits, address strength deficits, analyze and address soft tissue restrictions, analyze and cue movement patterns and analyze and modify body mechanics/ergonomics to attain remaining goals.      []  See Plan of Care  []  See progress note/recertification  []  See Discharge Summary         Progress towards goals / Updated goals:  NT    PLAN  [x]  Upgrade activities as tolerated     [x]  Continue plan of care  []  Update interventions per flow sheet       []  Discharge due to:_  []  Other:_      Que Acosta, PT, DPT, OCS 1/27/2020

## 2020-01-30 ENCOUNTER — HOSPITAL ENCOUNTER (OUTPATIENT)
Dept: PHYSICAL THERAPY | Age: 45
Discharge: HOME | End: 2020-01-30
Payer: COMMERCIAL

## 2020-01-30 PROCEDURE — 97140 MANUAL THERAPY 1/> REGIONS: CPT | Performed by: PHYSICAL THERAPY ASSISTANT

## 2020-01-30 PROCEDURE — 97110 THERAPEUTIC EXERCISES: CPT | Performed by: PHYSICAL THERAPY ASSISTANT

## 2020-01-30 NOTE — PROGRESS NOTES
PT DAILY TREATMENT NOTE - [facility] 2-15    Patient Name: Emery Zayas  Date:2020  : 1975  [x]  Patient  Verified  Payor: Maricruz Kidd / Plan: Edyta Christine / Product Type: HMO /    In time: 7:55 am  Out time: 9:00 am  Total Treatment Time (min): 65  Total Timed Codes (min): 55  1:1 Treatment Time ([facility] only): 55   Visit #:  9    Treatment Area: Right hip pain [M25.551]    SUBJECTIVE  Pain Level (0-10 scale): 0  Any medication changes, allergies to medications, adverse drug reactions, diagnosis change, or new procedure performed?: [x] No    [] Yes (see summary sheet for update)  Subjective functional status/changes:   [] No changes reported  Pt states she was sore for a day after last DN session but has no pain today.     OBJECTIVE    Modality rationale: decrease edema, decrease inflammation and decrease pain to improve the patients ability to ambulate and transfer   Min Type Additional Details       [] Estim: []Att   []Unatt    []TENS instruct                  []IFC  []Premod   []NMES                     []Other:  []w/US   []w/ice   []w/heat  Position:  Location:       []  Traction: [] Cervical       []Lumbar                       [] Prone          []Supine                       []Intermittent   []Continuous Lbs:  [] before manual  [] after manual  []w/heat    []  Ultrasound: []Continuous   [] Pulsed                       at: []1MHz   []3MHz Location:  W/cm2:    [] Paraffin         Location:   []w/heat   10 [x]  Ice     []  Heat  []  Ice massage Position: supine  Location: R ant hip    []  Laser  []  Other: Position:  Location:      []  Vasopneumatic Device Pressure:       [] lo [] med [] hi   Temperature:      [x] Skin assessment post-treatment:  [x]intact []redness- no adverse reaction    []redness  adverse reaction:     45 min Therapeutic Exercise:  [x] See flow sheet :   Rationale: increase ROM, increase strength and improve coordination to improve the patients ability to ambulate and transfer    10 min Manual Therapy: STM to hip flexors and TFL, passive hip flexor stretch, inferior mobs on femur grade 2-3   Rationale: decrease pain, increase ROM, increase tissue extensibility and decrease trigger points to improve the patients ability to ambulate and transfer          With   [x] TE   [] TA   [] neuro   [] other: Patient Education: [x] Review HEP    [] Progressed/Changed HEP based on:   [x] positioning   [x] body mechanics   [] transfers   [x] heat/ice application    [] other:      Other Objective/Functional Measures: NT     Pain Level (0-10 scale) post treatment: 0    ASSESSMENT/Changes in Function:   Pt was able to perform full squats, lunges, and dead lifts w/o pain. Pt making great progress and will be d/c'd next week. Patient will continue to benefit from skilled PT services to modify and progress therapeutic interventions, address functional mobility deficits, address ROM deficits, address strength deficits, analyze and address soft tissue restrictions, analyze and cue movement patterns and analyze and modify body mechanics/ergonomics to attain remaining goals.      []  See Plan of Care  []  See progress note/recertification  []  See Discharge Summary         Progress towards goals / Updated goals:  NT    PLAN  [x]  Upgrade activities as tolerated     [x]  Continue plan of care  []  Update interventions per flow sheet       []  Discharge due to:_  []  Other:_      Bella Phan, PT, DPT, OCS 1/30/2020

## 2020-02-03 ENCOUNTER — APPOINTMENT (OUTPATIENT)
Dept: PHYSICAL THERAPY | Age: 45
End: 2020-02-03
Payer: COMMERCIAL

## 2020-02-05 ENCOUNTER — HOSPITAL ENCOUNTER (OUTPATIENT)
Dept: PHYSICAL THERAPY | Age: 45
Discharge: HOME | End: 2020-02-05
Payer: COMMERCIAL

## 2020-02-05 PROCEDURE — 97140 MANUAL THERAPY 1/> REGIONS: CPT | Performed by: PHYSICAL THERAPY ASSISTANT

## 2020-02-05 PROCEDURE — 97110 THERAPEUTIC EXERCISES: CPT | Performed by: PHYSICAL THERAPY ASSISTANT

## 2020-02-05 NOTE — PROGRESS NOTES
PT DAILY TREATMENT NOTE - [facility] -15    Patient Name: Artem Bray  Date:2020  : 1975  [x]  Patient  Verified  Payor: Anant Gonzalez / Plan: Ewelina Delacruz / Product Type: HMO /    In time: 4:25 pm  Out time: 5:30m  Total Treatment Time (min): 65  Total Timed Codes (min): 55  1:1 Treatment Time ( [address] only): 40  Visit #:  10    Treatment Area: Right hip pain [M25.551]    SUBJECTIVE  Pain Level (0-10 scale): 0  Any medication changes, allergies to medications, adverse drug reactions, diagnosis change, or new procedure performed?: [x] No    [] Yes (see summary sheet for update)  Subjective functional status/changes:   [] No changes reported  Pt states she is doing well and had mild soreness for one day after last session. Pt reports she is 90-95% improved and will be returning to MD on Friday.     OBJECTIVE    Modality rationale: decrease edema, decrease inflammation and decrease pain to improve the patients ability to ambulate and transfer   Min Type Additional Details       [] Estim: []Att   []Unatt    []TENS instruct                  []IFC  []Premod   []NMES                     []Other:  []w/US   []w/ice   []w/heat  Position:  Location:       []  Traction: [] Cervical       []Lumbar                       [] Prone          []Supine                       []Intermittent   []Continuous Lbs:  [] before manual  [] after manual  []w/heat    []  Ultrasound: []Continuous   [] Pulsed                       at: []1MHz   []3MHz Location:  W/cm2:    [] Paraffin         Location:   []w/heat   10 [x]  Ice     []  Heat  []  Ice massage Position: supine  Location: R ant hip    []  Laser  []  Other: Position:  Location:      []  Vasopneumatic Device Pressure:       [] lo [] med [] hi   Temperature:      [x] Skin assessment post-treatment:  [x]intact []redness- no adverse reaction    []redness  adverse reaction:     45 min Therapeutic Exercise:  [x] See flow sheet :   Rationale: increase ROM, increase strength and improve coordination to improve the patients ability to ambulate and transfer    10 min Manual Therapy: STM to hip flexors and TFL, passive hip flexor stretch, inferior mobs on femur grade 2-3   Rationale: decrease pain, increase ROM, increase tissue extensibility and decrease trigger points to improve the patients ability to ambulate and transfer          With   [x] TE   [] TA   [] neuro   [] other: Patient Education: [x] Review HEP    [] Progressed/Changed HEP based on:   [x] positioning   [x] body mechanics   [] transfers   [x] heat/ice application    [] other:      Other Objective/Functional Measures:   Full hip and lumbar ROM    MMT: hip flexion 5/5, hip ER 5/5, abd 5/5    R SLS: 30 sec    Squat and lunge is pain free    Mild TTP over TFL and rectus femoris     Pain Level (0-10 scale) post treatment: 0    ASSESSMENT/Changes in Function:   Pt has made very good progress w/ PT over the past few weeks and demonstrates full hip and lumbar ROM w/o pain. Pt is able to perform squats, lunges, and dead lifts w/o discomfort. Pt has been educated on returning to gym exercises and has been given updated HEP. Pt will be d/c'd from our care at this time. Patient will continue to benefit from skilled PT services to modify and progress therapeutic interventions, address functional mobility deficits, address ROM deficits, address strength deficits, analyze and address soft tissue restrictions, analyze and cue movement patterns and analyze and modify body mechanics/ergonomics to attain remaining goals.      []  See Plan of Care  []  See progress note/recertification  [x]  See Discharge Summary         Progress towards goals / Updated goals:  NT    PLAN  []  Upgrade activities as tolerated     [x]  Continue plan of care  []  Update interventions per flow sheet       [x]  Discharge due to:_ MET goals  []  Other:_      Que Acosta, PT, DPT, OCS 2/5/2020

## 2020-06-19 ENCOUNTER — HOSPITAL ENCOUNTER (OUTPATIENT)
Dept: MAMMOGRAPHY | Age: 45
Discharge: HOME | End: 2020-06-19
Attending: SPECIALIST
Payer: COMMERCIAL

## 2020-06-19 DIAGNOSIS — Z12.31 VISIT FOR SCREENING MAMMOGRAM: ICD-10-CM

## 2020-06-19 PROCEDURE — 77063 BREAST TOMOSYNTHESIS BI: CPT

## 2021-04-01 NOTE — PROGRESS NOTES
Per Dr. Sin, no need for platelet transfusion at this time. Dr. Sin made aware of patient's abdominal bruising. Day team to follow up with hematology. Patient denies any symptoms other than 6/10 headache.   [facility]  [facility], [address] [place] [address]  Phone: [phone]  Fax: [phone]    Discharge Summary  2-15    Patient name: Jackelyn Rick  : 1975  Provider#:[id number]  Referral source: Irene Loaiza MD      Medical/Treatment Diagnosis: Right hip pain [M25.551]     Prior Hospitalization: see medical history     Comorbidities: see chart  Prior Level of Function:see chart  Medications: Verified on Patient Summary List    Start of Care: 19      Onset Date:2019   Visits from Start of Care: 10     Missed Visits: 1  Reporting Period : 19 to 2020    Objective/Functional Measures:   Full hip and lumbar ROM    MMT: hip flexion 5/5, hip ER 5/5, abd 5/5    R SLS: 30 sec    Squat and lunge is pain free    Mild TTP over TFL and rectus femoris     Pain Level (0-10 scale) post treatment: 0    ASSESSMENT/Changes in Function:   Pt has made very good progress w/ PT over the past few weeks and demonstrates full hip and lumbar ROM w/o pain. Pt is able to perform squats, lunges, and dead lifts w/o discomfort. Pt has been educated on returning to gym exercises and has been given updated HEP. Pt will be d/c'd from our care at this time.     RECOMMENDATIONS:  [x]Discontinue therapy: [x]Patient has reached or is progressing toward set goals      []Patient is non-compliant or has abdicated      []Due to lack of appreciable progress towards set goals    Julián Vega, PT, DPT, OCS 2020

## 2021-06-01 ENCOUNTER — TRANSCRIBE ORDER (OUTPATIENT)
Dept: SCHEDULING | Age: 46
End: 2021-06-01

## 2021-06-01 DIAGNOSIS — Z12.31 SCREENING MAMMOGRAM FOR HIGH-RISK PATIENT: Primary | ICD-10-CM

## 2021-07-01 ENCOUNTER — HOSPITAL ENCOUNTER (OUTPATIENT)
Dept: MAMMOGRAPHY | Age: 46
Discharge: HOME | End: 2021-07-01
Attending: SPECIALIST
Payer: COMMERCIAL

## 2021-07-01 DIAGNOSIS — Z12.31 SCREENING MAMMOGRAM FOR HIGH-RISK PATIENT: ICD-10-CM

## 2021-07-01 PROCEDURE — 77063 BREAST TOMOSYNTHESIS BI: CPT

## 2022-06-10 ENCOUNTER — TRANSCRIBE ORDER (OUTPATIENT)
Dept: SCHEDULING | Age: 47
End: 2022-06-10

## 2022-06-10 DIAGNOSIS — Z12.31 VISIT FOR SCREENING MAMMOGRAM: Primary | ICD-10-CM

## 2022-07-18 ENCOUNTER — HOSPITAL ENCOUNTER (OUTPATIENT)
Dept: MAMMOGRAPHY | Age: 47
Discharge: HOME | End: 2022-07-18
Attending: SPECIALIST
Payer: COMMERCIAL

## 2022-07-18 DIAGNOSIS — Z12.31 VISIT FOR SCREENING MAMMOGRAM: ICD-10-CM

## 2022-07-18 PROCEDURE — 77063 BREAST TOMOSYNTHESIS BI: CPT

## 2022-07-21 ENCOUNTER — HOSPITAL ENCOUNTER (OUTPATIENT)
Dept: MAMMOGRAPHY | Age: 47
Discharge: HOME | End: 2022-07-21
Attending: SPECIALIST
Payer: COMMERCIAL

## 2022-07-21 ENCOUNTER — HOSPITAL ENCOUNTER (OUTPATIENT)
Dept: MAMMOGRAPHY | Age: 47
Discharge: HOME | End: 2022-07-21
Payer: COMMERCIAL

## 2022-07-21 DIAGNOSIS — R92.8 ABNORMAL MAMMOGRAM OF LEFT BREAST: ICD-10-CM

## 2022-07-21 PROCEDURE — 76642 ULTRASOUND BREAST LIMITED: CPT

## 2022-07-21 PROCEDURE — 77061 BREAST TOMOSYNTHESIS UNI: CPT

## 2023-06-26 ENCOUNTER — TRANSCRIBE ORDERS (OUTPATIENT)
Facility: HOSPITAL | Age: 48
End: 2023-06-26

## 2023-06-26 DIAGNOSIS — Z12.31 VISIT FOR SCREENING MAMMOGRAM: Primary | ICD-10-CM

## 2023-07-31 ENCOUNTER — HOSPITAL ENCOUNTER (OUTPATIENT)
Facility: HOSPITAL | Age: 48
Discharge: HOME | End: 2023-08-03
Attending: SPECIALIST
Payer: COMMERCIAL

## 2023-07-31 DIAGNOSIS — Z12.31 VISIT FOR SCREENING MAMMOGRAM: ICD-10-CM

## 2023-07-31 PROCEDURE — 77063 BREAST TOMOSYNTHESIS BI: CPT

## 2024-02-20 NOTE — PROGRESS NOTES
SUBJECTIVE:      Patient ID: Ester Guillermo is a 70 y.o. female.    Chief Complaint: Diabetes    Patient is here today to f/u on diabetes mellitus, htn, hypothyroidism and review labs. She is following a healthy diet and has not been going on cruises. Lost weight. Has a cruise coming up in a week and she is looking forward to it. She is feeling good, no complaints    Diabetes  She presents for her follow-up diabetic visit. She has type 2 diabetes mellitus. MedicAlert identification noted. The initial diagnosis of diabetes was made 20 years ago. Her disease course has been improving. There are no hypoglycemic associated symptoms. Pertinent negatives for hypoglycemia include no confusion, dizziness, headaches, hunger, mood changes, nervousness/anxiousness, pallor, seizures, sleepiness, speech difficulty, sweats or tremors. Pertinent negatives for diabetes include no blurred vision, no chest pain, no fatigue, no foot paresthesias, no foot ulcerations, no polydipsia, no polyphagia, no polyuria, no visual change, no weakness and no weight loss. There are no hypoglycemic complications. Pertinent negatives for hypoglycemia complications include no blackouts, no hospitalization, no nocturnal hypoglycemia, no required assistance and no required glucagon injection. Symptoms are stable. There are no diabetic complications. Pertinent negatives for diabetic complications include no autonomic neuropathy, CVA, heart disease, impotence, nephropathy, peripheral neuropathy, PVD or retinopathy. There are no known risk factors for coronary artery disease. Current diabetic treatment includes oral agent (dual therapy). She is compliant with treatment most of the time. She is currently taking insulin pre-breakfast and at bedtime. Her weight is stable. She is following a diabetic, low fat/cholesterol and low salt diet. Meal planning includes avoidance of concentrated sweets and carbohydrate counting. She has not had a previous visit  PT  DAILY TREATMENT NOTE 2-15    Patient Name: Eric Organ  Date:2019  : 1975  [x]  Patient  Verified  Payor: Tiffany Davis / Plan: [employer] / Product Type: HMO /     In time:   Out time:       Total Treatment Time (min):  60     Total Timed Codes (min): 50   1:1 Treatment Time ( [address] only):    Visit #: 9    Treatment Area: Pain in right shoulder [M25.511]    SUBJECTIVE  Pain Level (0-10 scale), subjective functional status/changes: Pt reports pain is 0/10 at rest.  Pt reports still has pain with certain movements, out to the side. Chitra Cole She will see Dr. Shoaib Becerril Monday. Pt reports her arm felt looser, better, after last visit. Any medication changes, allergies to medications, adverse drug reactions, diagnosis change, or new procedure performed?: [x] No    [] Yes (see summary sheet for update) SEE ABOVE. OBJECTIVE     10' min Modality:      [x]  Ice     []  Heat       Position/location:    R shoulder end of session   Rationale: decrease pain to improve the patients ability to do functional activities   [x] Skin assessment post-treatment:  [x]intact []redness- no adverse reaction    []redness  adverse reaction:      40 min Therapeutic Exercise:  [x] See flow sheet:     Increased resistance per flow sheet. Rationale: increase strength, improve coordination and increase proprioception to improve the patients ability to reach OH    10 min Manual Therapy: thoracic PA mobs T3 through T7. Central - grade III   Inferior and posterior GH mobs.     Rationale: decrease pain, increase tissue extensibility and decrease trigger points  to improve the patients ability to -          With   [] TE   [] TA   [] neuro   [] other: Patient Education: [x] Review HEP    [] Progressed/Changed HEP based on:   [] positioning   [] body mechanics   [] transfers   [] heat/ice application    [] other:       Pain Level (0-10 scale) post treatment: 0    Less pain and more mobility into shoulder ABD end of session. ASSESSMENT/Changes in Function:   Pt with improved shoulder abduction and less pain with abduction end of session. Patient will continue to benefit from skilled PT services to modify and progress therapeutic interventions, address ROM deficits, address strength deficits, analyze and cue movement patterns, assess and modify postural abnormalities and instruct in home and community integration to attain remaining goals. Short Term Goals: To be accomplished in 2-3 weeks:              1) Pt independent in HEP from day 1 MET               2) Pt will be able to reach overhead to 140 deg or more of AROM without increased pain PROGRESSING     Long Term Goals: To be accomplished in 4-6 weeks:              1) Pt independent in final HEP. progressing              2) Pt will report she is able perform ADL's such as reaching to opposite underarm to apply deodorant, etc. progressing               3) Pt will be able to return to UE strengthening at the gym with modifications as needed from PT without increased pain. progressing              4) Pt will be able to reach behind her back to T10 or higher for less difficulty with tucking in her shirt or bathing. progressing.        PLAN      [x]  Continue plan of care    [x]  Other:_ cont 1-2x/week for 4 weeks from 05/30 , f/u with Dr. Anna Adams visit    Christiano Valverde, PT DPT, OCS 6/6/2019  9:14 PM       PT License #[id number] with a dietitian. She participates in exercise intermittently. She monitors blood glucose at home 1-2 x per week. She monitors urine at home <1 x per month. Blood glucose monitoring compliance is inadequate. Her home blood glucose trend is decreasing steadily. An ACE inhibitor/angiotensin II receptor blocker is being taken. She sees a podiatrist.Eye exam is current.   Hyperlipidemia  This is a chronic problem. The current episode started more than 1 year ago. The problem is controlled. Recent lipid tests were reviewed and are normal. Pertinent negatives include no chest pain or shortness of breath. Current antihyperlipidemic treatment includes statins. The current treatment provides significant improvement of lipids. Risk factors for coronary artery disease include dyslipidemia and diabetes mellitus.   Thyroid Problem  Presents for follow-up visit. Patient reports no anxiety, cold intolerance, constipation, diaphoresis, diarrhea, fatigue, heat intolerance, menstrual problem, palpitations, tremors, visual change or weight loss. The symptoms have been stable. Her past medical history is significant for hyperlipidemia.       Past Surgical History:   Procedure Laterality Date    APPENDECTOMY      EYE SURGERY Bilateral 06/01/2023     Family History   Problem Relation Age of Onset    Cancer Mother     Breast cancer Mother     Diabetes Father       Social History     Socioeconomic History    Marital status:    Tobacco Use    Smoking status: Never     Passive exposure: Never    Smokeless tobacco: Never   Substance and Sexual Activity    Alcohol use: Never    Drug use: Never     Social Determinants of Health     Financial Resource Strain: Low Risk  (2/17/2024)    Overall Financial Resource Strain (CARDIA)     Difficulty of Paying Living Expenses: Not hard at all   Food Insecurity: No Food Insecurity (2/17/2024)    Hunger Vital Sign     Worried About Running Out of Food in the Last Year: Never true     Ran Out of Food  in the Last Year: Never true   Transportation Needs: No Transportation Needs (2/17/2024)    PRAPARE - Transportation     Lack of Transportation (Medical): No     Lack of Transportation (Non-Medical): No   Physical Activity: Insufficiently Active (2/17/2024)    Exercise Vital Sign     Days of Exercise per Week: 3 days     Minutes of Exercise per Session: 20 min   Stress: No Stress Concern Present (2/17/2024)    [employer] - Occupational Stress Questionnaire     Feeling of Stress : Only a little   Social Connections: Unknown (2/17/2024)    Social Connection and Isolation Panel [NHANES]     Frequency of Communication with Friends and Family: More than three times a week     Frequency of Social Gatherings with Friends and Family: More than three times a week     Active Member of Clubs or Organizations: No     Attends Club or Organization Meetings: Never     Marital Status:    Housing Stability: Low Risk  (2/17/2024)    Housing Stability Vital Sign     Unable to Pay for Housing in the Last Year: No     Number of Places Lived in the Last Year: 1     Unstable Housing in the Last Year: No     Current Outpatient Medications   Medication Sig Dispense Refill    FLUoxetine 20 MG capsule Take 1 capsule (20 mg total) by mouth once daily. 90 capsule 1    levothyroxine (SYNTHROID) 88 MCG tablet Take 1 tablet (88 mcg total) by mouth once daily. 90 tablet 1    metFORMIN (GLUCOPHAGE-XR) 500 MG ER 24hr tablet Take 2 tablets (1,000 mg total) by mouth 2 (two) times daily with meals. 360 tablet 1    ramipriL (ALTACE) 2.5 MG capsule Take 1 capsule (2.5 mg total) by mouth once daily. 90 capsule 1    simvastatin (ZOCOR) 40 MG tablet Take 1 tablet (40 mg total) by mouth every evening. 90 tablet 1    SITagliptin phosphate (JANUVIA) 100 MG Tab Take 1 tablet (100 mg total) by mouth once daily. 90 tablet 1     No current facility-administered medications for this visit.     Review of patient's allergies  "indicates:   Allergen Reactions    Iodine Swelling      Past Medical History:   Diagnosis Date    Diabetes mellitus, type 2     2000 diagnosed    Hyperlipidemia     Hypothyroidism     2000 diagnosed     Past Surgical History:   Procedure Laterality Date    APPENDECTOMY      EYE SURGERY Bilateral 06/01/2023       Review of Systems   Constitutional:  Negative for activity change, appetite change, chills, diaphoresis, fatigue, unexpected weight change and weight loss.   HENT:  Negative for ear discharge, hearing loss, rhinorrhea, trouble swallowing and voice change.    Eyes:  Negative for blurred vision, photophobia, pain, discharge and visual disturbance.   Respiratory:  Negative for chest tightness, shortness of breath, wheezing and stridor.    Cardiovascular:  Negative for chest pain and palpitations.   Gastrointestinal:  Negative for abdominal pain, blood in stool, constipation, diarrhea and vomiting.   Endocrine: Negative for cold intolerance, heat intolerance, polydipsia, polyphagia and polyuria.   Genitourinary:  Negative for difficulty urinating, dysuria, flank pain, hematuria, impotence and menstrual problem.   Musculoskeletal:  Negative for arthralgias, joint swelling, neck pain and neck stiffness.   Skin:  Negative for pallor.   Neurological:  Negative for dizziness, tremors, seizures, speech difficulty, weakness and headaches.   Hematological:  Does not bruise/bleed easily.   Psychiatric/Behavioral:  Negative for confusion, dysphoric mood, self-injury, sleep disturbance and suicidal ideas. The patient is not nervous/anxious.       OBJECTIVE:      Vitals:    02/20/24 1307   BP: (P) 120/60   Pulse: 110   SpO2: 98%   Weight: 69.4 kg (153 lb)   Height: 5' 6" (1.676 m)     Physical Exam  Vitals and nursing note reviewed.   Constitutional:       General: She is not in acute distress.     Appearance: She is well-developed.   HENT:      Head: Normocephalic and atraumatic.      Right Ear: Tympanic membrane normal. "      Left Ear: Tympanic membrane normal.      Nose: Nose normal.      Mouth/Throat:      Pharynx: Uvula midline.   Eyes:      General: Lids are normal.      Conjunctiva/sclera: Conjunctivae normal.      Pupils: Pupils are equal, round, and reactive to light.      Right eye: Pupil is round and reactive.      Left eye: Pupil is round and reactive.   Neck:      Thyroid: No thyromegaly.      Vascular: No JVD.      Trachea: Trachea normal.   Cardiovascular:      Rate and Rhythm: Normal rate and regular rhythm.      Pulses: Normal pulses.      Heart sounds: Normal heart sounds. No murmur heard.  Pulmonary:      Effort: Pulmonary effort is normal. No tachypnea or respiratory distress.      Breath sounds: Normal breath sounds. No wheezing, rhonchi or rales.   Abdominal:      General: Bowel sounds are normal.      Palpations: Abdomen is soft.      Tenderness: There is no abdominal tenderness.   Musculoskeletal:         General: Normal range of motion.      Cervical back: Normal range of motion and neck supple.      Right lower leg: No edema.      Left lower leg: No edema.   Lymphadenopathy:      Cervical: No cervical adenopathy.   Skin:     General: Skin is warm and dry.      Findings: No rash.   Neurological:      Mental Status: She is alert and oriented to person, place, and time.   Psychiatric:         Mood and Affect: Mood normal.         Speech: Speech normal.         Behavior: Behavior normal. Behavior is cooperative.         Thought Content: Thought content normal.         Judgment: Judgment normal.        Lab Visit on 02/14/2024   Component Date Value Ref Range Status    TSH 02/14/2024 1.613  0.340 - 5.600 uIU/mL Final    Free T4 02/14/2024 0.80  0.71 - 1.51 ng/dL Final    Hemoglobin A1C 02/14/2024 6.7 (H)  4.5 - 6.2 % Final    Comment: According to ADA guidelines, hemoglobin A1C <7.0% represents  optimal control in non-pregnant diabetic patients.  Different  metrics may apply to specific populations.   Standards of  Medical Care in Diabetes - 2016.    For the purpose of screening for the presence of diabetes:  <5.7%     Consistent with the absence of diabetes  5.7-6.4%  Consistent with increasing risk for diabetes   (prediabetes)  >or=6.5%  Consistent with diabetes    Currently no consensus exists for use of hemoglobin A1C  for diagnosis of diabetes for children.      Estimated Avg Glucose 02/14/2024 146 (H)  68 - 131 mg/dL Final    Sodium 02/14/2024 140  136 - 145 mmol/L Final    Potassium 02/14/2024 4.1  3.5 - 5.1 mmol/L Final    Chloride 02/14/2024 104  95 - 110 mmol/L Final    CO2 02/14/2024 28  23 - 29 mmol/L Final    Glucose 02/14/2024 129 (H)  70 - 110 mg/dL Final    BUN 02/14/2024 11  8 - 23 mg/dL Final    Creatinine 02/14/2024 0.6  0.5 - 1.4 mg/dL Final    Calcium 02/14/2024 9.2  8.7 - 10.5 mg/dL Final    Total Protein 02/14/2024 7.1  6.0 - 8.4 g/dL Final    Albumin 02/14/2024 4.3  3.5 - 5.2 g/dL Final    Total Bilirubin 02/14/2024 0.5  0.1 - 1.0 mg/dL Final    Comment: For infants and newborns, interpretation of results should be based  on gestational age, weight and in agreement with clinical  observations.    Premature Infant recommended reference ranges:  Up to 24 hours.............<8.0 mg/dL  Up to 48 hours............<12.0 mg/dL  3-5 days..................<15.0 mg/dL  6-29 days.................<15.0 mg/dL      Alkaline Phosphatase 02/14/2024 57  55 - 135 U/L Final    AST 02/14/2024 13  10 - 40 U/L Final    ALT 02/14/2024 9 (L)  10 - 44 U/L Final    eGFR 02/14/2024 >60.0  >60 mL/min/1.73 m^2 Final    Anion Gap 02/14/2024 8  8 - 16 mmol/L Final    Cholesterol 02/14/2024 147  120 - 199 mg/dL Final    Comment: The National Cholesterol Education Program (NCEP) has set the  following guidelines (reference ranges) for Cholesterol:  Optimal.....................<200 mg/dL  Borderline High.............200-239 mg/dL  High........................> or = 240 mg/dL      Triglycerides 02/14/2024 197 (H)  30 - 150 mg/dL Final     Comment: The National Cholesterol Education Program (NCEP) has set the  following guidelines (reference values) for triglycerides:  Normal......................<150 mg/dL  Borderline High.............150-199 mg/dL  High........................200-499 mg/dL      HDL 02/14/2024 53  40 - 75 mg/dL Final    Comment: The National Cholesterol Education Program (NCEP) has set the  following guidelines (reference values) for HDL Cholesterol:  Low...............<40 mg/dL  Optimal...........>60 mg/dL      LDL Cholesterol 02/14/2024 54.6 (L)  63.0 - 159.0 mg/dL Final    Comment: The National Cholesterol Education Program (NCEP) has set the  following guidelines (reference values) for LDL Cholesterol:  Optimal.......................<130 mg/dL  Borderline High...............130-159 mg/dL  High..........................160-189 mg/dL  Very High.....................>190 mg/dL      HDL/Cholesterol Ratio 02/14/2024 36.1  20.0 - 50.0 % Final    Total Cholesterol/HDL Ratio 02/14/2024 2.8  2.0 - 5.0 Final    Non-HDL Cholesterol 02/14/2024 94  mg/dL Final    Comment: Risk category and Non-HDL cholesterol goals:  Coronary heart disease (CHD)or equivalent (10-year risk of CHD >20%):  Non-HDL cholesterol goal     <130 mg/dL  Two or more CHD risk factors and 10-year risk of CHD <= 20%:  Non-HDL cholesterol goal     <160 mg/dL  0 to 1 CHD risk factor:  Non-HDL cholesterol goal     <190 mg/dL     ]  Assessment:       1. Type 2 diabetes mellitus without complication, without long-term current use of insulin    2. Acquired hypothyroidism    3. Essential hypertension    4. Anxiety    5. Mixed hyperlipidemia    6. Need for pneumococcal vaccination        Plan:       Type 2 diabetes mellitus without complication, without long-term current use of insulin  -     SITagliptin phosphate (JANUVIA) 100 MG Tab; Take 1 tablet (100 mg total) by mouth once daily.  Dispense: 90 tablet; Refill: 1  -     ramipriL (ALTACE) 2.5 MG capsule; Take 1 capsule (2.5 mg  total) by mouth once daily.  Dispense: 90 capsule; Refill: 1  -     metFORMIN (GLUCOPHAGE-XR) 500 MG ER 24hr tablet; Take 2 tablets (1,000 mg total) by mouth 2 (two) times daily with meals.  Dispense: 360 tablet; Refill: 1    Acquired hypothyroidism  -     levothyroxine (SYNTHROID) 88 MCG tablet; Take 1 tablet (88 mcg total) by mouth once daily.  Dispense: 90 tablet; Refill: 1    Essential hypertension  -     ramipriL (ALTACE) 2.5 MG capsule; Take 1 capsule (2.5 mg total) by mouth once daily.  Dispense: 90 capsule; Refill: 1  -     X-Ray Chest PA And Lateral; Future; Expected date: 02/20/2024    Anxiety  -     FLUoxetine 20 MG capsule; Take 1 capsule (20 mg total) by mouth once daily.  Dispense: 90 capsule; Refill: 1    Mixed hyperlipidemia  -     simvastatin (ZOCOR) 40 MG tablet; Take 1 tablet (40 mg total) by mouth every evening.  Dispense: 90 tablet; Refill: 1    Need for pneumococcal vaccination  -     Pneumococcal conjugate vaccine 20 (PCV20) *Preferred* (PREVNAR 20) - (IM)        Follow up in about 6 months (around 8/20/2024) for DM.      2/20/2024 ERNESTO Gamboa, FNP

## 2024-07-05 ENCOUNTER — TRANSCRIBE ORDERS (OUTPATIENT)
Facility: HOSPITAL | Age: 49
End: 2024-07-05

## 2024-07-05 DIAGNOSIS — Z12.31 SCREENING MAMMOGRAM FOR HIGH-RISK PATIENT: Primary | ICD-10-CM

## 2024-07-31 ENCOUNTER — HOSPITAL ENCOUNTER (OUTPATIENT)
Facility: HOSPITAL | Age: 49
Discharge: HOME | End: 2024-08-03
Attending: SPECIALIST
Payer: COMMERCIAL

## 2024-07-31 DIAGNOSIS — Z12.31 SCREENING MAMMOGRAM FOR HIGH-RISK PATIENT: ICD-10-CM

## 2024-07-31 PROCEDURE — 77063 BREAST TOMOSYNTHESIS BI: CPT

## 2025-06-16 ENCOUNTER — TRANSCRIBE ORDERS (OUTPATIENT)
Facility: HOSPITAL | Age: 50
End: 2025-06-16

## 2025-06-16 DIAGNOSIS — Z12.31 OTHER SCREENING MAMMOGRAM: Primary | ICD-10-CM

## 2025-08-04 ENCOUNTER — HOSPITAL ENCOUNTER (OUTPATIENT)
Facility: HOSPITAL | Age: 50
Discharge: HOME | End: 2025-08-07
Attending: SPECIALIST
Payer: COMMERCIAL

## 2025-08-04 DIAGNOSIS — Z12.31 OTHER SCREENING MAMMOGRAM: ICD-10-CM

## 2025-08-04 PROCEDURE — 77063 BREAST TOMOSYNTHESIS BI: CPT
